# Patient Record
Sex: MALE | Race: BLACK OR AFRICAN AMERICAN | NOT HISPANIC OR LATINO | Employment: FULL TIME | URBAN - METROPOLITAN AREA
[De-identification: names, ages, dates, MRNs, and addresses within clinical notes are randomized per-mention and may not be internally consistent; named-entity substitution may affect disease eponyms.]

---

## 2018-04-24 ENCOUNTER — OFFICE VISIT (OUTPATIENT)
Dept: MEDICAL GROUP | Facility: MEDICAL CENTER | Age: 43
End: 2018-04-24
Payer: COMMERCIAL

## 2018-04-24 VITALS
DIASTOLIC BLOOD PRESSURE: 74 MMHG | WEIGHT: 159 LBS | SYSTOLIC BLOOD PRESSURE: 122 MMHG | RESPIRATION RATE: 14 BRPM | TEMPERATURE: 98.6 F | BODY MASS INDEX: 22.26 KG/M2 | HEART RATE: 74 BPM | HEIGHT: 71 IN | OXYGEN SATURATION: 97 %

## 2018-04-24 DIAGNOSIS — J01.00 ACUTE NON-RECURRENT MAXILLARY SINUSITIS: ICD-10-CM

## 2018-04-24 DIAGNOSIS — Z72.0 TOBACCO USE: ICD-10-CM

## 2018-04-24 PROCEDURE — 99214 OFFICE O/P EST MOD 30 MIN: CPT | Performed by: FAMILY MEDICINE

## 2018-04-24 RX ORDER — FLUTICASONE PROPIONATE 50 MCG
1 SPRAY, SUSPENSION (ML) NASAL DAILY
Qty: 16 G | Refills: 0 | Status: SHIPPED | OUTPATIENT
Start: 2018-04-24 | End: 2018-08-20

## 2018-04-24 RX ORDER — AMOXICILLIN AND CLAVULANATE POTASSIUM 875; 125 MG/1; MG/1
1 TABLET, FILM COATED ORAL 2 TIMES DAILY
Qty: 14 TAB | Refills: 0 | Status: SHIPPED | OUTPATIENT
Start: 2018-04-24 | End: 2018-05-01

## 2018-04-24 RX ORDER — NICOTINE 21 MG/24HR
1 PATCH, TRANSDERMAL 24 HOURS TRANSDERMAL EVERY 24 HOURS
Qty: 30 PATCH | Refills: 3 | Status: SHIPPED | OUTPATIENT
Start: 2018-04-24 | End: 2018-08-20

## 2018-04-24 ASSESSMENT — PATIENT HEALTH QUESTIONNAIRE - PHQ9: CLINICAL INTERPRETATION OF PHQ2 SCORE: 0

## 2018-04-25 NOTE — PROGRESS NOTES
"cc: Nasal congestion    Subjective:     Eyad Milton is a 42 y.o. male presenting with his girlfriend to discuss nasal congestion. Associated with the cough at night, subjective fevers, poor taste in his mouth. This has been going on for about 2 months, but is minimally better. Denies any fevers, nausea, vomiting, ear pain, sore throat, chest pain, shortness of breath, abdominal pain, rashes, recent travel. His daughters are sick with something similar. Denies any seasonal allergies. Has tried Benadryl with minimal improvement. He does continue to smoke about a pack per day. Would like to quit    Review of systems:  See above.        Current Outpatient Prescriptions:   •  nicotine (NICODERM) 14 MG/24HR PATCH 24 HR, Apply 1 Patch to skin as directed every 24 hours., Disp: 30 Patch, Rfl: 3  •  fluticasone (FLONASE) 50 MCG/ACT nasal spray, Spray 1 Spray in nose every day., Disp: 16 g, Rfl: 0  •  amoxicillin-clavulanate (AUGMENTIN) 875-125 MG Tab, Take 1 Tab by mouth 2 times a day for 7 days., Disp: 14 Tab, Rfl: 0    No Known Allergies    History reviewed. No pertinent past medical history.  History reviewed. No pertinent surgical history.  Family History   Problem Relation Age of Onset   • Other Mother      healthy   • Other Father      healthy     Social History     Social History   • Marital status: Single     Spouse name: N/A   • Number of children: N/A   • Years of education: N/A     Occupational History   • Not on file.     Social History Main Topics   • Smoking status: Current Every Day Smoker     Packs/day: 0.50     Types: Cigarettes   • Smokeless tobacco: Never Used   • Alcohol use 6.0 oz/week     10 Cans of beer per week   • Drug use: No   • Sexual activity: Yes     Partners: Female     Other Topics Concern   • Not on file     Social History Narrative   • No narrative on file       Objective:     Vitals: /74   Pulse 74   Temp 37 °C (98.6 °F)   Resp 14   Ht 1.803 m (5' 10.98\")   Wt 72.1 kg " (159 lb)   SpO2 97%   BMI 22.19 kg/m²   General: Alert, pleasant, NAD  HEENT: Normocephalic.  PERRL, EOMI, no icterus or pallor.  Conjunctivae and lids normal. External ears normal. Tympanic membranes pearly, opaque.  Nares patent, mucosa pink, drainage present.  Maxillary sinus tenderness.  Oropharynx non-erythematous, mucous membranes moist.  Neck supple.  No thyromegaly or masses palpated. No cervical or supraclavicular lymphadenopathy.  Heart: Regular rate and rhythm.  S1 and S2 normal.  No murmurs appreciated.  Respiratory: Normal respiratory effort.  Clear to auscultation bilaterally.  Abdomen: Non-distended, soft  Skin: Warm, dry, no rashes.  Musculoskeletal: Gait is normal.    Psych:  Affect/mood is normal, judgement is good, memory is intact, grooming is appropriate.    Assessment/Plan:     Eyad was seen today for uri.    Diagnoses and all orders for this visit:    Acute non-recurrent maxillary sinusitis  -     fluticasone (FLONASE) 50 MCG/ACT nasal spray; Spray 1 Spray in nose every day.  -     amoxicillin-clavulanate (AUGMENTIN) 875-125 MG Tab; Take 1 Tab by mouth 2 times a day for 7 days.    Tobacco use  Advice to quit, patient is interested in, he was interested in trying the nicotine patches. Follow-up in 3 months. He will need a pneumonia vaccine  -     nicotine (NICODERM) 14 MG/24HR PATCH 24 HR; Apply 1 Patch to skin as directed every 24 hours.        Return in about 3 months (around 7/24/2018) for routine follow up.

## 2018-05-07 ENCOUNTER — APPOINTMENT (OUTPATIENT)
Dept: MEDICAL GROUP | Facility: MEDICAL CENTER | Age: 43
End: 2018-05-07
Payer: COMMERCIAL

## 2018-07-23 ENCOUNTER — TELEPHONE (OUTPATIENT)
Dept: MEDICAL GROUP | Facility: MEDICAL CENTER | Age: 43
End: 2018-07-23

## 2018-08-20 ENCOUNTER — OFFICE VISIT (OUTPATIENT)
Dept: MEDICAL GROUP | Facility: MEDICAL CENTER | Age: 43
End: 2018-08-20
Payer: COMMERCIAL

## 2018-08-20 VITALS
HEIGHT: 70 IN | HEART RATE: 85 BPM | DIASTOLIC BLOOD PRESSURE: 72 MMHG | OXYGEN SATURATION: 98 % | RESPIRATION RATE: 16 BRPM | BODY MASS INDEX: 22.05 KG/M2 | WEIGHT: 154 LBS | SYSTOLIC BLOOD PRESSURE: 136 MMHG | TEMPERATURE: 97.4 F

## 2018-08-20 DIAGNOSIS — Z00.00 ROUTINE GENERAL MEDICAL EXAMINATION AT A HEALTH CARE FACILITY: ICD-10-CM

## 2018-08-20 DIAGNOSIS — Z23 NEED FOR PNEUMOCOCCAL VACCINE: ICD-10-CM

## 2018-08-20 PROCEDURE — 90471 IMMUNIZATION ADMIN: CPT | Performed by: NURSE PRACTITIONER

## 2018-08-20 PROCEDURE — 99396 PREV VISIT EST AGE 40-64: CPT | Mod: 25 | Performed by: NURSE PRACTITIONER

## 2018-08-20 PROCEDURE — 90732 PPSV23 VACC 2 YRS+ SUBQ/IM: CPT | Performed by: NURSE PRACTITIONER

## 2018-08-21 NOTE — PROGRESS NOTES
"Subjective:      Eyad Milton is a 42 y.o. male who presents with Annual Exam        CC: Patient is here today for annual physical and brings with him paper from his employer to be filled out for this.    HPI Eyad Milton was last seen by myself in May 2016 and he states he does not come in regularly because he is healthy. He was seen earlier this year for sinusitis by one of my colleagues. He does continue to smoke cigarettes although he states he only smokes a quarter pack a day now. He drinks about 7 cans of beer per week and denies drug usage. He feels he is generally healthy but has not done any lab work recently.      No current outpatient prescriptions on file.     No current facility-administered medications for this visit.      Social History   Substance Use Topics   • Smoking status: Current Every Day Smoker     Packs/day: 0.25     Types: Cigarettes   • Smokeless tobacco: Never Used   • Alcohol use 4.2 oz/week     7 Cans of beer per week     Family History   Problem Relation Age of Onset   • Other Mother         healthy   • Other Father         healthy   History reviewed. No pertinent past medical history.    Review of Systems   All other systems reviewed and are negative.         Objective:     /72   Pulse 85   Temp 36.3 °C (97.4 °F)   Resp 16   Ht 1.778 m (5' 10\")   Wt 69.9 kg (154 lb)   SpO2 98%   BMI 22.10 kg/m²      Physical Exam   Constitutional: He is oriented to person, place, and time. He appears well-developed and well-nourished. No distress.   HENT:   Head: Normocephalic and atraumatic.   Right Ear: External ear normal.   Left Ear: External ear normal.   Nose: Nose normal.   Mouth/Throat: Oropharynx is clear and moist.   Eyes: Conjunctivae are normal. Right eye exhibits no discharge. Left eye exhibits no discharge.   Neck: Normal range of motion. Neck supple. No tracheal deviation present. No thyromegaly present.   Cardiovascular: Normal rate, regular rhythm and " normal heart sounds.    No murmur heard.  Pulmonary/Chest: Effort normal and breath sounds normal. No respiratory distress. He has no wheezes. He has no rales.   Lymphadenopathy:     He has no cervical adenopathy.   Neurological: He is alert and oriented to person, place, and time. Coordination normal.   Skin: Skin is warm and dry. No rash noted. He is not diaphoretic. No erythema.   Psychiatric: He has a normal mood and affect. His behavior is normal. Judgment and thought content normal.   Nursing note and vitals reviewed.              Assessment/Plan:     1. Routine general medical examination at a health care facility  Physical exam today appears normal and I did recommend he get some baseline blood work done. He will follow up if abnormal. He was advised to quit smoking and moderate his alcohol.  - COMP METABOLIC PANEL; Future  - LIPID PROFILE; Future    2. Need for pneumococcal vaccine  I have placed the below orders and discussed them with an approved delegating provider. The MA is performing the below orders under the direction of Dr. Wilks    - PNEUMOCOCCAL POLYSACCHARIDE VACCINE 23-VALENT =>1YO SQ/IM

## 2019-05-02 ENCOUNTER — OFFICE VISIT (OUTPATIENT)
Dept: MEDICAL GROUP | Facility: MEDICAL CENTER | Age: 44
End: 2019-05-02
Payer: COMMERCIAL

## 2019-05-02 VITALS
BODY MASS INDEX: 24.05 KG/M2 | RESPIRATION RATE: 16 BRPM | DIASTOLIC BLOOD PRESSURE: 70 MMHG | SYSTOLIC BLOOD PRESSURE: 118 MMHG | HEART RATE: 91 BPM | HEIGHT: 70 IN | WEIGHT: 168 LBS | OXYGEN SATURATION: 97 %

## 2019-05-02 DIAGNOSIS — R19.09 NODULE OF GROIN: ICD-10-CM

## 2019-05-02 PROCEDURE — 99214 OFFICE O/P EST MOD 30 MIN: CPT | Performed by: NURSE PRACTITIONER

## 2019-05-02 RX ORDER — CEPHALEXIN 500 MG/1
500 CAPSULE ORAL 4 TIMES DAILY
Qty: 28 CAP | Refills: 0 | Status: SHIPPED | OUTPATIENT
Start: 2019-05-02 | End: 2019-05-09

## 2019-05-02 ASSESSMENT — PATIENT HEALTH QUESTIONNAIRE - PHQ9: CLINICAL INTERPRETATION OF PHQ2 SCORE: 0

## 2019-05-02 NOTE — PROGRESS NOTES
"Subjective:      Eyad Milton is a 43 y.o. male who presents with Other (lump in pelvic area)    CC: Patient here todaye4  Nodular area which she is concerned about.        HPI Eyad Milton      1. Nodule of groin  Patient reports that approximately 3 to 4 weeks ago he developed a irritated area in the mid groin region.  He states it was erythematous and swollen but it has gone down in size over the past week.  He had no associated fever, discharge or redness.  Soon afterwards he noticed a large nodular area to the right of this.  He states he feels otherwise well but it has not gotten better or worse.  Social History   Substance Use Topics   • Smoking status: Current Every Day Smoker     Packs/day: 0.25     Types: Cigarettes   • Smokeless tobacco: Never Used   • Alcohol use 4.2 oz/week     7 Cans of beer per week     Current Outpatient Prescriptions   Medication Sig Dispense Refill   • cephALEXin (KEFLEX) 500 MG Cap Take 1 Cap by mouth 4 times a day for 7 days. 28 Cap 0     No current facility-administered medications for this visit.    History reviewed. No pertinent past medical history.   Family History   Problem Relation Age of Onset   • Other Mother         healthy   • Other Father         healthy       Review of Systems   Skin: Positive for rash.   All other systems reviewed and are negative.         Objective:     /70 (BP Location: Right arm, Patient Position: Sitting, BP Cuff Size: Adult)   Pulse 91   Resp 16   Ht 1.778 m (5' 10\")   Wt 76.2 kg (168 lb)   SpO2 97%   BMI 24.11 kg/m²      Physical Exam   Constitutional: He is oriented to person, place, and time. He appears well-developed and well-nourished. No distress.   HENT:   Head: Normocephalic and atraumatic.   Right Ear: External ear normal.   Left Ear: External ear normal.   Nose: Nose normal.   Mouth/Throat: Oropharynx is clear and moist.   Eyes: Conjunctivae are normal. Right eye exhibits no discharge. Left eye exhibits " no discharge.   Neck: Normal range of motion. Neck supple. No tracheal deviation present. No thyromegaly present.   Cardiovascular: Normal rate, regular rhythm and normal heart sounds.    No murmur heard.  Pulmonary/Chest: Effort normal and breath sounds normal. No respiratory distress. He has no wheezes. He has no rales.   Lymphadenopathy:     He has no cervical adenopathy.   Neurological: He is alert and oriented to person, place, and time. Coordination normal.   Skin: Skin is warm and dry. Rash noted. He is not diaphoretic. No erythema.   There appears to be a healing abscess like area in the mid groin region and on the right side of the groin region there is a nonmovable nodular, nontender area.   Psychiatric: He has a normal mood and affect. His behavior is normal. Judgment and thought content normal.   Nursing note and vitals reviewed.              Assessment/Plan:     1. Nodule of groin  I suspect that patient had a recent groin abscess and now has subsequent reactive lymph node but I explained I cannot say for sure.  I will start him on Keflex for 7 days and the abscess area does not appear to need incision and drainage currently.  Assuming this does not improve in the next week, I recommended he have an ultrasound of the area to see if biopsy is needed.  - cephALEXin (KEFLEX) 500 MG Cap; Take 1 Cap by mouth 4 times a day for 7 days.  Dispense: 28 Cap; Refill: 0  - US-ABDOMEN LTD (SOFT TISSUE); Future

## 2019-08-09 ENCOUNTER — OFFICE VISIT (OUTPATIENT)
Dept: MEDICAL GROUP | Facility: MEDICAL CENTER | Age: 44
End: 2019-08-09
Payer: COMMERCIAL

## 2019-08-09 VITALS
WEIGHT: 165 LBS | DIASTOLIC BLOOD PRESSURE: 74 MMHG | SYSTOLIC BLOOD PRESSURE: 134 MMHG | OXYGEN SATURATION: 96 % | HEART RATE: 80 BPM | TEMPERATURE: 97.4 F | HEIGHT: 70 IN | RESPIRATION RATE: 16 BRPM | BODY MASS INDEX: 23.62 KG/M2

## 2019-08-09 DIAGNOSIS — H92.01 RIGHT EAR PAIN: ICD-10-CM

## 2019-08-09 DIAGNOSIS — J02.9 PHARYNGITIS, UNSPECIFIED ETIOLOGY: ICD-10-CM

## 2019-08-09 PROCEDURE — 99214 OFFICE O/P EST MOD 30 MIN: CPT | Performed by: NURSE PRACTITIONER

## 2019-08-09 RX ORDER — AMOXICILLIN AND CLAVULANATE POTASSIUM 875; 125 MG/1; MG/1
1 TABLET, FILM COATED ORAL 2 TIMES DAILY
Qty: 20 TAB | Refills: 0 | Status: SHIPPED | OUTPATIENT
Start: 2019-08-09 | End: 2019-08-19

## 2019-08-09 ASSESSMENT — ENCOUNTER SYMPTOMS: SORE THROAT: 1

## 2019-08-09 NOTE — PROGRESS NOTES
Subjective:      Eyad Milton is a 43 y.o. male who presents with Pharyngitis and Otalgia        CC: Patient is here today accompanied by his significant other for sore throat and ear pain.    HPI       1. Pharyngitis, unspecified etiology  Patient reports that for approximately 2 weeks he has been having sore throat.  It also affects his tongue and right ear.  He admits to a fractured tooth on the left upper side as well.  Nothing is made him feel better and symptoms are worse with swallowing.  He states he has no trouble with breathing or inability to swallow.  He denies fever, chills, nausea or vomiting.  He states he has not been exposed to anybody with strep throat that he is aware of.    2. Right ear pain  Patient states his right ear also has been giving him pain for about 1 to 2 weeks.  He has had no discharge from the ear  No past medical history on file.  Social History     Socioeconomic History   • Marital status: Single     Spouse name: Not on file   • Number of children: Not on file   • Years of education: Not on file   • Highest education level: Not on file   Occupational History   • Not on file   Social Needs   • Financial resource strain: Not on file   • Food insecurity:     Worry: Not on file     Inability: Not on file   • Transportation needs:     Medical: Not on file     Non-medical: Not on file   Tobacco Use   • Smoking status: Current Every Day Smoker     Packs/day: 0.25     Types: Cigarettes   • Smokeless tobacco: Never Used   Substance and Sexual Activity   • Alcohol use: Yes     Alcohol/week: 4.2 oz     Types: 7 Cans of beer per week   • Drug use: No   • Sexual activity: Yes     Partners: Female   Lifestyle   • Physical activity:     Days per week: Not on file     Minutes per session: Not on file   • Stress: Not on file   Relationships   • Social connections:     Talks on phone: Not on file     Gets together: Not on file     Attends Judaism service: Not on file     Active member of  "club or organization: Not on file     Attends meetings of clubs or organizations: Not on file     Relationship status: Not on file   • Intimate partner violence:     Fear of current or ex partner: Not on file     Emotionally abused: Not on file     Physically abused: Not on file     Forced sexual activity: Not on file   Other Topics Concern   • Not on file   Social History Narrative   • Not on file     Current Outpatient Medications   Medication Sig Dispense Refill   • amoxicillin-clavulanate (AUGMENTIN) 875-125 MG Tab Take 1 Tab by mouth 2 times a day for 10 days. 20 Tab 0     No current facility-administered medications for this visit.      Family History   Problem Relation Age of Onset   • Other Mother         healthy   • Other Father         healthy         Review of Systems   HENT: Positive for ear pain and sore throat.    All other systems reviewed and are negative.         Objective:     /74 (BP Location: Right arm, Patient Position: Sitting, BP Cuff Size: Adult)   Pulse 80   Temp 36.3 °C (97.4 °F) (Temporal)   Resp 16   Ht 1.778 m (5' 10\")   Wt 74.8 kg (165 lb)   SpO2 96%   BMI 23.68 kg/m²      Physical Exam   Constitutional: He is oriented to person, place, and time. He appears well-developed and well-nourished. No distress.   HENT:   Head: Normocephalic and atraumatic.   Right Ear: External ear normal.   Left Ear: External ear normal.   Nose: Nose normal.   Mouth/Throat: Oropharynx is clear and moist.   Right ear canal is occluded with cerumen.  Posterior pharynx is erythematous and mildly swollen with some white exudate.  There is a fractured left upper tooth.   Eyes: Conjunctivae are normal. Right eye exhibits no discharge. Left eye exhibits no discharge.   Neck: Normal range of motion. Neck supple. No tracheal deviation present. No thyromegaly present.   Cardiovascular: Normal rate, regular rhythm and normal heart sounds.   No murmur heard.  Pulmonary/Chest: Effort normal and breath sounds " normal. No respiratory distress. He has no wheezes. He has no rales.   Lymphadenopathy:        Head (right side): Tonsillar adenopathy present.        Head (left side): Tonsillar adenopathy present.     He has no cervical adenopathy.   Neurological: He is alert and oriented to person, place, and time. Coordination normal.   Skin: Skin is warm and dry. No rash noted. He is not diaphoretic. No erythema.   Psychiatric: He has a normal mood and affect. His behavior is normal. Judgment and thought content normal.   Nursing note and vitals reviewed.              Assessment/Plan:     1. Pharyngitis, unspecified etiology  Rapid strep in the office today is negative but patient shows erythema and exudate of the posterior pharynx.  I explained that this could be a strep throat that was not picked up on her testing but also could be radiating pain from the right ear or from his fractured tooth which all can be contributing to this.  I am going to put him on Augmentin to cover for possible otitis media or strep throat.  He was advised to go to the ER should he develop any trouble breathing or swallowing despite treatment.  He will go on probiotics.  I explained how to take the medicine appropriately.  He may use Tylenol for pain.  - amoxicillin-clavulanate (AUGMENTIN) 875-125 MG Tab; Take 1 Tab by mouth 2 times a day for 10 days.  Dispense: 20 Tab; Refill: 0    2. Right ear pain  I am unable to visualize the right tympanic membrane because of cerumen impaction and advised him to try to use over-the-counter Debrox earwax removal kit once he is starting to feel better.  - amoxicillin-clavulanate (AUGMENTIN) 875-125 MG Tab; Take 1 Tab by mouth 2 times a day for 10 days.  Dispense: 20 Tab; Refill: 0

## 2020-08-17 ENCOUNTER — OFFICE VISIT (OUTPATIENT)
Dept: MEDICAL GROUP | Facility: MEDICAL CENTER | Age: 45
End: 2020-08-17
Payer: COMMERCIAL

## 2020-08-17 VITALS
SYSTOLIC BLOOD PRESSURE: 124 MMHG | WEIGHT: 160 LBS | HEIGHT: 71 IN | BODY MASS INDEX: 22.4 KG/M2 | RESPIRATION RATE: 16 BRPM | OXYGEN SATURATION: 96 % | HEART RATE: 82 BPM | DIASTOLIC BLOOD PRESSURE: 76 MMHG

## 2020-08-17 DIAGNOSIS — Z00.00 ROUTINE GENERAL MEDICAL EXAMINATION AT A HEALTH CARE FACILITY: ICD-10-CM

## 2020-08-17 DIAGNOSIS — Z23 NEED FOR TDAP VACCINATION: ICD-10-CM

## 2020-08-17 PROCEDURE — 90715 TDAP VACCINE 7 YRS/> IM: CPT | Performed by: NURSE PRACTITIONER

## 2020-08-17 PROCEDURE — 90471 IMMUNIZATION ADMIN: CPT | Performed by: NURSE PRACTITIONER

## 2020-08-17 PROCEDURE — 99396 PREV VISIT EST AGE 40-64: CPT | Mod: 25 | Performed by: NURSE PRACTITIONER

## 2020-08-17 ASSESSMENT — PATIENT HEALTH QUESTIONNAIRE - PHQ9: CLINICAL INTERPRETATION OF PHQ2 SCORE: 0

## 2020-08-18 NOTE — PROGRESS NOTES
Subjective:      Eyad Milton is a 44 y.o. male who presents with Annual Exam        CC: Patient is here today accompanied by his wife for yearly physical.  His last physical was in 2018.    HPI         1. Routine general medical examination at a health care facility  Patient reports he feels well and has no physical or mental health complaints today.  He had some mild lower right groin pain about 2 weeks ago that lasted for an hour or so but went away and has not returned.  He states he does exercise and works full-time.    2. Need for Tdap vaccination  Patient states he has not had a vaccine in at least 10 years.  History reviewed. No pertinent past medical history.  Social History     Socioeconomic History   • Marital status: Single     Spouse name: Not on file   • Number of children: Not on file   • Years of education: Not on file   • Highest education level: Not on file   Occupational History   • Not on file   Social Needs   • Financial resource strain: Not on file   • Food insecurity     Worry: Not on file     Inability: Not on file   • Transportation needs     Medical: Not on file     Non-medical: Not on file   Tobacco Use   • Smoking status: Current Every Day Smoker     Packs/day: 0.25     Types: Cigarettes   • Smokeless tobacco: Never Used   Substance and Sexual Activity   • Alcohol use: Yes     Alcohol/week: 4.2 oz     Types: 7 Cans of beer per week   • Drug use: No   • Sexual activity: Yes     Partners: Female   Lifestyle   • Physical activity     Days per week: Not on file     Minutes per session: Not on file   • Stress: Not on file   Relationships   • Social connections     Talks on phone: Not on file     Gets together: Not on file     Attends Orthodox service: Not on file     Active member of club or organization: Not on file     Attends meetings of clubs or organizations: Not on file     Relationship status: Not on file   • Intimate partner violence     Fear of current or ex partner: Not on  "file     Emotionally abused: Not on file     Physically abused: Not on file     Forced sexual activity: Not on file   Other Topics Concern   • Not on file   Social History Narrative   • Not on file     No current outpatient medications on file.     No current facility-administered medications for this visit.      Family History   Problem Relation Age of Onset   • Other Mother         healthy   • Other Father         healthy         Review of Systems   All other systems reviewed and are negative.         Objective:     /76 (BP Location: Left arm, Patient Position: Sitting, BP Cuff Size: Adult)   Pulse 82   Resp 16   Ht 1.803 m (5' 11\")   Wt 72.6 kg (160 lb)   SpO2 96%   BMI 22.32 kg/m²      Physical Exam  Vitals signs and nursing note reviewed.   Constitutional:       General: He is not in acute distress.     Appearance: He is well-developed. He is not diaphoretic.   HENT:      Head: Normocephalic and atraumatic.      Right Ear: External ear normal.      Left Ear: External ear normal.      Nose: Nose normal.   Eyes:      General:         Right eye: No discharge.         Left eye: No discharge.      Conjunctiva/sclera: Conjunctivae normal.   Neck:      Musculoskeletal: Normal range of motion and neck supple.      Thyroid: No thyromegaly.      Trachea: No tracheal deviation.   Cardiovascular:      Rate and Rhythm: Normal rate and regular rhythm.      Heart sounds: Normal heart sounds. No murmur.   Pulmonary:      Effort: Pulmonary effort is normal. No respiratory distress.      Breath sounds: Normal breath sounds. No wheezing or rales.   Abdominal:      General: Bowel sounds are normal. There is no distension.      Palpations: Abdomen is soft. There is no mass.      Tenderness: There is no abdominal tenderness. There is no guarding or rebound.      Hernia: No hernia is present.   Lymphadenopathy:      Cervical: No cervical adenopathy.   Skin:     General: Skin is warm and dry.      Findings: No erythema or " rash.   Neurological:      Mental Status: He is alert and oriented to person, place, and time.      Coordination: Coordination normal.   Psychiatric:         Behavior: Behavior normal.         Thought Content: Thought content normal.         Judgment: Judgment normal.                 Assessment/Plan:        1. Routine general medical examination at a health care facility  Exam appears normal today and I again recommended lab work since he has not done it over the last 4 years.  I told him if he develops abdominal pain again and it is persisting, then he should come back to the office for recheck.  We will continue to exercise and keep his BMI under control.  - Comp Metabolic Panel; Future  - Lipid Profile; Future    2. Need for Tdap vaccination  I have placed the below orders and discussed them with an approved delegating provider. The MA is performing the below orders under the direction of Dr. Lackey    - Tdap =>8yo IM

## 2021-02-19 ENCOUNTER — OFFICE VISIT (OUTPATIENT)
Dept: URGENT CARE | Facility: PHYSICIAN GROUP | Age: 46
End: 2021-02-19
Payer: COMMERCIAL

## 2021-02-19 VITALS
HEART RATE: 78 BPM | RESPIRATION RATE: 16 BRPM | TEMPERATURE: 97.2 F | BODY MASS INDEX: 23.1 KG/M2 | SYSTOLIC BLOOD PRESSURE: 134 MMHG | DIASTOLIC BLOOD PRESSURE: 100 MMHG | WEIGHT: 165 LBS | HEIGHT: 71 IN | OXYGEN SATURATION: 96 %

## 2021-02-19 DIAGNOSIS — Z20.2 EXPOSURE TO TRICHOMONAS: ICD-10-CM

## 2021-02-19 PROCEDURE — 99213 OFFICE O/P EST LOW 20 MIN: CPT | Performed by: PHYSICIAN ASSISTANT

## 2021-02-19 RX ORDER — METRONIDAZOLE 500 MG/1
500 TABLET ORAL 2 TIMES DAILY
Qty: 14 TABLET | Refills: 0 | Status: SHIPPED | OUTPATIENT
Start: 2021-02-19 | End: 2021-02-26

## 2021-02-20 ASSESSMENT — ENCOUNTER SYMPTOMS
CHILLS: 0
SHORTNESS OF BREATH: 0
ABDOMINAL PAIN: 0
VOMITING: 0
COUGH: 0
SORE THROAT: 0
DIARRHEA: 0
FEVER: 0
EYE PAIN: 0
MYALGIAS: 0
HEADACHES: 0
NAUSEA: 0
CONSTIPATION: 0

## 2021-02-21 NOTE — PROGRESS NOTES
"Subjective:   Eyad Milton is a 45 y.o. male who presents for Sexually Transmitted Diseases (Testing, partner pos for trich)      HPI:  This is a 45-year-old male who presents to urgent care after his partner tested positive for trichomonas yesterday and was prescribed course of metronidazole 500 mg twice daily for 7 days.  The patient denies any symptoms including penile discharge, dysuria, frequency or urgency.  He has exposure to trichomonas and is requesting treatment.  Both him and his partner befuddled as they have no risk factors for developing a trichomoniasis infection    Review of Systems   Constitutional: Negative for chills and fever.   HENT: Negative for congestion, ear pain and sore throat.    Eyes: Negative for pain.   Respiratory: Negative for cough and shortness of breath.    Cardiovascular: Negative for chest pain.   Gastrointestinal: Negative for abdominal pain, constipation, diarrhea, nausea and vomiting.   Genitourinary: Negative for dysuria.   Musculoskeletal: Negative for myalgias.   Skin: Negative for rash.   Neurological: Negative for headaches.       Medications:    • metroNIDAZOLE Tabs    Allergies: Patient has no known allergies.    Problem List: Eyad Milton has Tobacco use on their problem list.    Surgical History:  No past surgical history on file.    Past Social Hx: Eyad Milton  reports that he has been smoking cigarettes. He has been smoking about 0.25 packs per day. He has never used smokeless tobacco. He reports current alcohol use of about 4.2 oz of alcohol per week. He reports that he does not use drugs.     Past Family Hx:  Eyad Milton family history includes Other in his father and mother.     Problem list, medications, and allergies reviewed by myself today in Epic.     Objective:     /100   Pulse 78   Temp 36.2 °C (97.2 °F)   Resp 16   Ht 1.803 m (5' 11\")   Wt 74.8 kg (165 lb)   SpO2 96%   BMI 23.01 kg/m²     Physical " Exam  Vitals reviewed.   Constitutional:       Appearance: Normal appearance.   HENT:      Head: Normocephalic and atraumatic.      Right Ear: External ear normal.      Left Ear: External ear normal.      Nose: Nose normal.      Mouth/Throat:      Mouth: Mucous membranes are moist.   Eyes:      Conjunctiva/sclera: Conjunctivae normal.   Cardiovascular:      Rate and Rhythm: Normal rate.   Pulmonary:      Effort: Pulmonary effort is normal.   Skin:     General: Skin is warm and dry.      Capillary Refill: Capillary refill takes less than 2 seconds.   Neurological:      Mental Status: He is alert and oriented to person, place, and time.         Assessment/Plan:     Diagnosis and associated orders:     1. Exposure to trichomonas  metroNIDAZOLE (FLAGYL) 500 MG Tab      Comments/MDM:              Differential diagnosis, natural history, supportive care, and indications for immediate follow-up discussed.    Advised the patient to follow-up with the primary care physician for recheck, reevaluation, and consideration of further management.    Please note that this dictation was created using voice recognition software. I have made a reasonable attempt to correct obvious errors, but I expect that there are errors of grammar and possibly content that I did not discover before finalizing the note.    This note was electronically signed by Wang Yun PA-C

## 2021-10-20 ENCOUNTER — OFFICE VISIT (OUTPATIENT)
Dept: MEDICAL GROUP | Facility: PHYSICIAN GROUP | Age: 46
End: 2021-10-20
Payer: COMMERCIAL

## 2021-10-20 VITALS
DIASTOLIC BLOOD PRESSURE: 72 MMHG | HEIGHT: 70 IN | OXYGEN SATURATION: 97 % | BODY MASS INDEX: 23.05 KG/M2 | WEIGHT: 161 LBS | TEMPERATURE: 98.5 F | HEART RATE: 87 BPM | RESPIRATION RATE: 14 BRPM | SYSTOLIC BLOOD PRESSURE: 102 MMHG

## 2021-10-20 DIAGNOSIS — Z72.0 TOBACCO USE: ICD-10-CM

## 2021-10-20 DIAGNOSIS — R19.5 CHANGE IN CONSISTENCY OF STOOL: ICD-10-CM

## 2021-10-20 DIAGNOSIS — K40.21 BILATERAL RECURRENT INGUINAL HERNIA WITHOUT OBSTRUCTION OR GANGRENE: ICD-10-CM

## 2021-10-20 DIAGNOSIS — Z13.220 SCREENING, LIPID: ICD-10-CM

## 2021-10-20 DIAGNOSIS — R10.31 RLQ ABDOMINAL PAIN: ICD-10-CM

## 2021-10-20 DIAGNOSIS — Z13.228 SCREENING FOR METABOLIC DISORDER: ICD-10-CM

## 2021-10-20 DIAGNOSIS — Z23 NEED FOR VACCINATION: ICD-10-CM

## 2021-10-20 PROCEDURE — 99214 OFFICE O/P EST MOD 30 MIN: CPT | Mod: 25 | Performed by: STUDENT IN AN ORGANIZED HEALTH CARE EDUCATION/TRAINING PROGRAM

## 2021-10-20 PROCEDURE — 90471 IMMUNIZATION ADMIN: CPT | Performed by: STUDENT IN AN ORGANIZED HEALTH CARE EDUCATION/TRAINING PROGRAM

## 2021-10-20 PROCEDURE — 90686 IIV4 VACC NO PRSV 0.5 ML IM: CPT | Performed by: STUDENT IN AN ORGANIZED HEALTH CARE EDUCATION/TRAINING PROGRAM

## 2021-10-20 ASSESSMENT — PATIENT HEALTH QUESTIONNAIRE - PHQ9: CLINICAL INTERPRETATION OF PHQ2 SCORE: 0

## 2021-10-20 NOTE — ASSESSMENT & PLAN NOTE
Chronic and ongoing issue, for at least 15 years.   Had previously noted reducible left inguinal hernia,      but recently concerned about hernia / pain on right groin.  Thinks he had imaging, a long time ago,     but not in our system.  Previously declined surgery, due to cost.   Now has insurance and would like follow-up.   - will get CT, given other pain concern....  ... likely referral, depending on results...

## 2021-10-20 NOTE — PATIENT INSTRUCTIONS
Please get the labs done in the next few days.  Please get them done while fasting (no food, coffee, or juices, for 8 hours - but water is ok).     Please get the imaging done, when able.  Please return in 2 weeks.  Thank you.

## 2021-10-20 NOTE — ASSESSMENT & PLAN NOTE
Chronic and ongoing.   Currently smoking 1 ppd.   Has tried using nicotine gum, but didn't work.   Open to trying to quit.   - will get basic labs  (none recently).   ...  Then may consider Wellbutrin       (chantix on national backorder).

## 2021-10-20 NOTE — PROGRESS NOTES
New to Provider  (Prior [facility])      Eyad Milton is a 46 y.o. male.    Reason for visit: PCP switch         - Prior PCP:  Karol VASQUES ([facility]).     Chief Complaint   Patient presents with   • Establish Care             Problems discussed this visit:    This note uses problem-based documentation.  Subjective HPI is included in Assessment & Plan, at bottom of note.   Problem   Bilateral Recurrent Inguinal Hernia Without Obstruction Or Gangrene   Rlq Abdominal Pain   Change in Consistency of Stool   Tobacco Use                          Past Medical History:   Diagnosis Date   • Hernia, inguinal        History reviewed. No pertinent surgical history.    Family History   Problem Relation Age of Onset   • Cancer Mother         uncertain type; but treated.   • No Known Problems Father    • No Known Problems Daughter        Social History     Tobacco Use   • Smoking status: Current Every Day Smoker     Packs/day: 0.25     Years: 30.00     Pack years: 7.50     Types: Cigarettes   • Smokeless tobacco: Never Used   • Tobacco comment: Smokes  (prev.1/4ppd x 30 yrs... recently up to 1 ppd)....   Substance Use Topics   • Alcohol use: Yes     Comment: 3 per week       Current medications:  (including new changes):  No current outpatient medications on file.     No current facility-administered medications for this visit.       Allergies as of 10/20/2021   • (No Known Allergies)                    Review of Symptoms  (as noted elsewhere, and .....)    GEN/CONST:   Denies fever, chills, fatigue,    CARDIO:   Denies chest pain, palpitations, or edema.    RESP:   Denies shortness of breath, wheezing, or coughing.  GI:    Denies nausea, vomiting,     ...  But soft consistency... (borderline diarrhea ?)      + abdominal/pelvic pains / hernias, per HPI....  :   Denies dysuria, hematuria,     MSK:  Denies joint pains  or muscle aches.    NEURO:  Denies numbness or focal weakness.       PSYCH:  Denies  "anxiety, depression,       Physical Exam  /72 (BP Location: Right arm, Patient Position: Sitting, BP Cuff Size: Adult)   Pulse 87   Temp 36.9 °C (98.5 °F) (Temporal)   Resp 14   Ht 1.765 m (5' 9.5\")   Wt 73 kg (161 lb)   SpO2 97%   BMI 23.43 kg/m²   General:  Alert and oriented, No apparent distress.  Neck: Supple. No lymphadenopathy noted. Thyroid not enlarged.   Lungs: Clear to auscultation bilaterally.  No wheezes or rales.     Cardiovascular: Regular rate and rhythm.  No murmurs, or gallops.  Abdomen:  Soft.  Non-distended.  Non-tender.        + bulge with cough, over left inguinal region.      - No bulge on right inguinal region.      ? Slightly/questionable tender to right suprapubic region        (but far medial to McBurney's point).   Extremities: No leg edema.  Good general motion of extremities.    Psychological: Appears to have normal mood and affect.      Labs:  No recent labs to review....                          Assessment & Plan  (with subjective history and orders):  Of note, the list below is not in a specific nor sortable order.      Problem List Items Addressed This Visit     Bilateral recurrent inguinal hernia without obstruction or gangrene     Chronic and ongoing issue, for at least 15 years.   Had previously noted reducible left inguinal hernia,      but recently concerned about hernia / pain on right groin.  Thinks he had imaging, a long time ago,     but not in our system.  Previously declined surgery, due to cost.   Now has insurance and would like follow-up.   - will get CT, given other pain concern....  ... likely referral, depending on results...         Relevant Orders    CT-ABDOMEN-PELVIS WITH    Change in consistency of stool     Patient only notes mild change, in stool.  Chronically soft for about 6 months.  Denies watery diarrhea or blood.  No nausea or vomiting, fevers or chills.  -Will try Metamucil /fiber at night.  -Also getting CT, as noted elsewhere.  -follow-up " again, after basic labs and imaging.          Relevant Orders    CBC WITH DIFFERENTIAL    TSH WITH REFLEX TO FT4    RLQ abdominal pain     Fairly recent issue for past few weeks/month.  Has noted more pain discomfort,     Mostly RLQ / on right inguinal area.  Had attributed to possible hernia,     (due ot hx on other side)     but never felt a bulge on that side.  Notes some softer stools than usual,      for the past few months.  Notes episodic pain increase with activities/walking.  Exam with discomfort medial to McBurney     (but negative McBurney, and no rebound).   - get Ab/plv CT (with contrast) to check abdominal       structures as well as for potential hernias.          Relevant Orders    CT-ABDOMEN-PELVIS WITH    CBC WITH DIFFERENTIAL    Comp Metabolic Panel    Lipid Profile    TSH WITH REFLEX TO FT4    Tobacco use     Chronic and ongoing.   Currently smoking 1 ppd.   Has tried using nicotine gum, but didn't work.   Open to trying to quit.   - will get basic labs  (none recently).   ...  Then may consider Wellbutrin       (chantix on national backorder).          Relevant Orders    CBC WITH DIFFERENTIAL      Other Visit Diagnoses     Need for vaccination        Relevant Orders    INFLUENZA VACCINE QUAD INJ (PF) (Completed)    Screening for metabolic disorder        Relevant Orders    CBC WITH DIFFERENTIAL    Comp Metabolic Panel    TSH WITH REFLEX TO FT4    Screening, lipid        Relevant Orders    Lipid Profile      Of note, the above list is not in a specific nor sortable order.                  Diagnosis Table, with orders:  1. RLQ abdominal pain  CT-ABDOMEN-PELVIS WITH    CBC WITH DIFFERENTIAL    Comp Metabolic Panel    Lipid Profile    TSH WITH REFLEX TO FT4   2. Bilateral recurrent inguinal hernia without obstruction or gangrene  CT-ABDOMEN-PELVIS WITH   3. Change in consistency of stool  CBC WITH DIFFERENTIAL    TSH WITH REFLEX TO FT4   4. Tobacco use  CBC WITH DIFFERENTIAL   5. Screening for  metabolic disorder  CBC WITH DIFFERENTIAL    Comp Metabolic Panel    TSH WITH REFLEX TO FT4   6. Screening, lipid  Lipid Profile   7. Need for vaccination  INFLUENZA VACCINE QUAD INJ (PF)                 Follow-up:    2 weeks ... Labs, Screening  (imaging, if done)              A computerized dictation system may have been used in this note.    Despite review, there may be some errors in spelling or grammar.    Gil Burleson M.D.  10/20/2021

## 2021-10-20 NOTE — ASSESSMENT & PLAN NOTE
Patient only notes mild change, in stool.  Chronically soft for about 6 months.  Denies watery diarrhea or blood.  No nausea or vomiting, fevers or chills.  -Will try Metamucil /fiber at night.  -Also getting CT, as noted elsewhere.  -follow-up again, after basic labs and imaging.

## 2021-10-20 NOTE — ASSESSMENT & PLAN NOTE
Fairly recent issue for past few weeks/month.  Has noted more pain discomfort,     Mostly RLQ / on right inguinal area.  Had attributed to possible hernia,     (due ot hx on other side)     but never felt a bulge on that side.  Notes some softer stools than usual,      for the past few months.  Notes episodic pain increase with activities/walking.  Exam with discomfort medial to McBurney     (but negative McBurney, and no rebound).   - get Ab/plv CT (with contrast) to check abdominal       structures as well as for potential hernias.

## 2021-11-01 ENCOUNTER — TELEPHONE (OUTPATIENT)
Dept: MEDICAL GROUP | Facility: PHYSICIAN GROUP | Age: 46
End: 2021-11-01

## 2021-11-01 DIAGNOSIS — K40.21 BILATERAL RECURRENT INGUINAL HERNIA WITHOUT OBSTRUCTION OR GANGRENE: ICD-10-CM

## 2021-11-01 DIAGNOSIS — R10.31 RLQ ABDOMINAL PAIN: ICD-10-CM

## 2021-11-01 DIAGNOSIS — R19.5 CHANGE IN CONSISTENCY OF STOOL: ICD-10-CM

## 2021-11-01 NOTE — PROGRESS NOTES
Insurance refused the order for CT abdomen.  Placing order for ultrasound (complete abdomen, as well as hernia).  1. RLQ abdominal pain  US-ABDOMEN COMPLETE SURVEY   2. Change in consistency of stool  US-ABDOMEN COMPLETE SURVEY   3. Bilateral recurrent inguinal hernia without obstruction or gangrene  US-INGUINAL HERNIA   Gil Burleson M.D., 11/1/2021

## 2021-11-01 NOTE — TELEPHONE ENCOUNTER
Please call patient.     Was contacted by imaging, as insurance declined to pay for his CT scan.…  They wanted ultrasounds first.  ...   Therefore, I placed order for complete abdomen ultrasound, as well as for hernia ultrasound.    Please call patient, and let him know he should double check and call imaging ([phone]) to schedule these procedures.  Thank you.

## 2021-11-08 ENCOUNTER — APPOINTMENT (OUTPATIENT)
Dept: RADIOLOGY | Facility: MEDICAL CENTER | Age: 46
End: 2021-11-08
Attending: STUDENT IN AN ORGANIZED HEALTH CARE EDUCATION/TRAINING PROGRAM
Payer: COMMERCIAL

## 2021-11-12 ENCOUNTER — OFFICE VISIT (OUTPATIENT)
Dept: MEDICAL GROUP | Facility: PHYSICIAN GROUP | Age: 46
End: 2021-11-12
Payer: COMMERCIAL

## 2021-11-12 VITALS
BODY MASS INDEX: 23.77 KG/M2 | DIASTOLIC BLOOD PRESSURE: 60 MMHG | WEIGHT: 166 LBS | HEART RATE: 90 BPM | HEIGHT: 70 IN | TEMPERATURE: 98.8 F | OXYGEN SATURATION: 99 % | SYSTOLIC BLOOD PRESSURE: 110 MMHG

## 2021-11-12 DIAGNOSIS — Z00.00 HEALTHCARE MAINTENANCE: ICD-10-CM

## 2021-11-12 DIAGNOSIS — K40.21 BILATERAL RECURRENT INGUINAL HERNIA WITHOUT OBSTRUCTION OR GANGRENE: ICD-10-CM

## 2021-11-12 DIAGNOSIS — Z00.00 PREVENTATIVE HEALTH CARE: ICD-10-CM

## 2021-11-12 PROCEDURE — 99213 OFFICE O/P EST LOW 20 MIN: CPT | Performed by: STUDENT IN AN ORGANIZED HEALTH CARE EDUCATION/TRAINING PROGRAM

## 2021-11-12 NOTE — PROGRESS NOTES
Established Patient     Eyad Milton is a 46 y.o. male.    Chief Complaint   Patient presents with   • Hernia     having more pain    • Paperwork     form for work / preventative             Problems addressed this visit:     This note uses problem-based documentation.  Subjective HPI is included in Assessment & Plan, at bottom of note.   Problem   Preventative Health Care   Healthcare Maintenance   Bilateral Recurrent Inguinal Hernia Without Obstruction Or Gangrene                           Past Medical History:   Diagnosis Date   • Hernia, inguinal        Patient Active Problem List   Diagnosis   • Tobacco use   • Bilateral recurrent inguinal hernia without obstruction or gangrene   • RLQ abdominal pain   • Change in consistency of stool   • Preventative health care   • Healthcare maintenance       History reviewed. No pertinent surgical history.       Family History   Problem Relation Age of Onset   • Cancer Mother         uncertain type; but treated.   • No Known Problems Father    • No Known Problems Daughter        Social History     Tobacco Use   • Smoking status: Current Every Day Smoker     Packs/day: 0.25     Years: 30.00     Pack years: 7.50     Types: Cigarettes   • Smokeless tobacco: Never Used   • Tobacco comment: Smokes  (prev.1/4ppd x 30 yrs... recently up to 1 ppd)....   Substance Use Topics   • Alcohol use: Yes     Comment: 3 per week       Current medications:  (including new changes):  No current outpatient medications on file.     No current facility-administered medications for this visit.       Allergies as of 11/12/2021   • (No Known Allergies)                   Review of Symptoms   (as noted elsewhere, and .....)   GEN/CONST:   Denies fever, chills,    CARDIO:   Denies chest pain, or edema.    RESP:   Denies shortness of breath, or coughing.  GI:    Denies nausea, vomiting,          + bilat.ing.hernias / pain/tenderness....  PSYCH:  Denies anxiety, depression,           Physical  "Exam  /60 (BP Location: Left arm, Patient Position: Sitting, BP Cuff Size: Adult)   Pulse 90   Temp 37.1 °C (98.8 °F) (Temporal)   Ht 1.765 m (5' 9.5\")   Wt 75.3 kg (166 lb)   SpO2 99%   BMI 24.16 kg/m²   General:  Alert and oriented, No apparent distress.    Lungs: Clear to auscultation bilaterally.  No wheezes or rales.  Cardiovascular: Regular rate and rhythm.  No murmurs, rubs or gallops.  Abdomen:  Soft.   No rebound or guarding.      + both inguinal regions with bulge on coughing. (L>R).     + tenderness over right inguinal canal.   Extremities:  No leg edema.  Good general motion of extremities.   Psychological: Appears to have normal mood and affect.          Labs: ... still has not obtained his labwork....    ... has multiple prior /  lab orders for past several years,       that were never done.                             Assessment & Plan  (with subjective history and orders):  Of note, the list below is not in a specific nor sortable order.      Problem List Items Addressed This Visit     Bilateral recurrent inguinal hernia without obstruction or gangrene     Chronic and ongoing issue, for at least 15 years.   Had previously noted reducible left inguinal hernia,      but recently concerned about hernia / pain on right groin.  Has been ongoing acutely, for ~1-2 months.     However, recently progressing and degree of pain.  Previously declined surgery, due to cost.   Now has insurance and would like follow-up.   … Previously ordered CT scan, but declined by insurance.  - Ultrasound has been ordered, but still pending scheduling.  - On exam, today, more prominence and tenderness than before.     (Bulge with cough, bilaterally, but more on left).  - Will place referral to general surgery.         Relevant Orders    Referral to General Surgery    Healthcare maintenance     influ vaccine - 10/2021  Pneumo Vaccine - n/a  Tetanus - 2020   Shingles, Colonoscopy, - n/a  Dexa, Hep.C.screen, " PSA - n/a   Labs < 12 mo / met screen - not done yet....          Preventative health care     Patient presents today, with forms to be completed for his employer, noting that he needs to have annual/preventative physical.… We will go over health screening, and preventive measures, as noted elsewhere.…   ... However, has not had blood work done, so can not speak to general screenings or lab work.   - form completion declined, until after labs completed.              Of note, the above list is not in a specific nor sortable order.                  Diagnosis Table, with orders:  1. Bilateral recurrent inguinal hernia without obstruction or gangrene  Referral to General Surgery   2. Preventative health care     3. Healthcare maintenance                     Follow-up:  2 weeks  (Labs and form)              A computerized dictation system may have been used in this note.    Despite review, there may be some errors in spelling or grammar.    Gil Burleson M.D.  11/12/2021

## 2021-11-12 NOTE — PATIENT INSTRUCTIONS
Please call imaging ([phone]), in order to schedule the ultrasound, to further evaluate the hernias.    Please follow-up with the referral(s) to GENERAL SURGERY.  You will likely receive a phone call or [employer] message, with information about what office to contact, in order to schedule.  However, if you do not hear from them in the next week or two, please call [phone], and ask for the referral line, to find out the status of the referral.       Please get the labs done in the next few days.  Please get them done while fasting (no food, coffee, or juices, for 8 hours - but water is ok).     Please return to review labs, and complete the form, at least 5-7 days after the lab work is done.    Thank you.

## 2021-11-17 ENCOUNTER — HOSPITAL ENCOUNTER (OUTPATIENT)
Dept: LAB | Facility: MEDICAL CENTER | Age: 46
End: 2021-11-17
Attending: STUDENT IN AN ORGANIZED HEALTH CARE EDUCATION/TRAINING PROGRAM
Payer: COMMERCIAL

## 2021-11-17 ENCOUNTER — HOSPITAL ENCOUNTER (OUTPATIENT)
Dept: RADIOLOGY | Facility: MEDICAL CENTER | Age: 46
End: 2021-11-17
Attending: STUDENT IN AN ORGANIZED HEALTH CARE EDUCATION/TRAINING PROGRAM
Payer: COMMERCIAL

## 2021-11-17 DIAGNOSIS — K40.21 BILATERAL RECURRENT INGUINAL HERNIA WITHOUT OBSTRUCTION OR GANGRENE: ICD-10-CM

## 2021-11-17 DIAGNOSIS — Z72.0 TOBACCO USE: ICD-10-CM

## 2021-11-17 DIAGNOSIS — R10.31 RLQ ABDOMINAL PAIN: ICD-10-CM

## 2021-11-17 DIAGNOSIS — Z13.220 SCREENING, LIPID: ICD-10-CM

## 2021-11-17 DIAGNOSIS — R19.5 CHANGE IN CONSISTENCY OF STOOL: ICD-10-CM

## 2021-11-17 DIAGNOSIS — Z13.228 SCREENING FOR METABOLIC DISORDER: ICD-10-CM

## 2021-11-17 LAB
ALBUMIN SERPL BCP-MCNC: 4.7 G/DL (ref 3.2–4.9)
ALBUMIN/GLOB SERPL: 1.5 G/DL
ALP SERPL-CCNC: 62 U/L (ref 30–99)
ALT SERPL-CCNC: 24 U/L (ref 2–50)
ANION GAP SERPL CALC-SCNC: 10 MMOL/L (ref 7–16)
AST SERPL-CCNC: 23 U/L (ref 12–45)
BASOPHILS # BLD AUTO: 0.5 % (ref 0–1.8)
BASOPHILS # BLD: 0.04 K/UL (ref 0–0.12)
BILIRUB SERPL-MCNC: 0.7 MG/DL (ref 0.1–1.5)
BUN SERPL-MCNC: 16 MG/DL (ref 8–22)
CALCIUM SERPL-MCNC: 9.1 MG/DL (ref 8.5–10.5)
CHLORIDE SERPL-SCNC: 108 MMOL/L (ref 96–112)
CHOLEST SERPL-MCNC: 207 MG/DL (ref 100–199)
CO2 SERPL-SCNC: 25 MMOL/L (ref 20–33)
CREAT SERPL-MCNC: 0.94 MG/DL (ref 0.5–1.4)
EOSINOPHIL # BLD AUTO: 0.61 K/UL (ref 0–0.51)
EOSINOPHIL NFR BLD: 7.9 % (ref 0–6.9)
ERYTHROCYTE [DISTWIDTH] IN BLOOD BY AUTOMATED COUNT: 45.9 FL (ref 35.9–50)
FASTING STATUS PATIENT QL REPORTED: NORMAL
GLOBULIN SER CALC-MCNC: 3.1 G/DL (ref 1.9–3.5)
GLUCOSE SERPL-MCNC: 90 MG/DL (ref 65–99)
HCT VFR BLD AUTO: 46.5 % (ref 42–52)
HDLC SERPL-MCNC: 49 MG/DL
HGB BLD-MCNC: 15.7 G/DL (ref 14–18)
IMM GRANULOCYTES # BLD AUTO: 0.01 K/UL (ref 0–0.11)
IMM GRANULOCYTES NFR BLD AUTO: 0.1 % (ref 0–0.9)
LDLC SERPL CALC-MCNC: 135 MG/DL
LYMPHOCYTES # BLD AUTO: 2.07 K/UL (ref 1–4.8)
LYMPHOCYTES NFR BLD: 26.7 % (ref 22–41)
MCH RBC QN AUTO: 31 PG (ref 27–33)
MCHC RBC AUTO-ENTMCNC: 33.8 G/DL (ref 33.7–35.3)
MCV RBC AUTO: 91.7 FL (ref 81.4–97.8)
MONOCYTES # BLD AUTO: 0.46 K/UL (ref 0–0.85)
MONOCYTES NFR BLD AUTO: 5.9 % (ref 0–13.4)
NEUTROPHILS # BLD AUTO: 4.55 K/UL (ref 1.82–7.42)
NEUTROPHILS NFR BLD: 58.9 % (ref 44–72)
NRBC # BLD AUTO: 0 K/UL
NRBC BLD-RTO: 0 /100 WBC
PLATELET # BLD AUTO: 271 K/UL (ref 164–446)
PMV BLD AUTO: 11 FL (ref 9–12.9)
POTASSIUM SERPL-SCNC: 4.3 MMOL/L (ref 3.6–5.5)
PROT SERPL-MCNC: 7.8 G/DL (ref 6–8.2)
RBC # BLD AUTO: 5.07 M/UL (ref 4.7–6.1)
SODIUM SERPL-SCNC: 143 MMOL/L (ref 135–145)
TRIGL SERPL-MCNC: 117 MG/DL (ref 0–149)
TSH SERPL DL<=0.005 MIU/L-ACNC: 0.78 UIU/ML (ref 0.38–5.33)
WBC # BLD AUTO: 7.7 K/UL (ref 4.8–10.8)

## 2021-11-17 PROCEDURE — 80053 COMPREHEN METABOLIC PANEL: CPT

## 2021-11-17 PROCEDURE — 80061 LIPID PANEL: CPT

## 2021-11-17 PROCEDURE — 76700 US EXAM ABDOM COMPLETE: CPT

## 2021-11-17 PROCEDURE — 85025 COMPLETE CBC W/AUTO DIFF WBC: CPT

## 2021-11-17 PROCEDURE — 84443 ASSAY THYROID STIM HORMONE: CPT

## 2021-11-17 PROCEDURE — 36415 COLL VENOUS BLD VENIPUNCTURE: CPT

## 2021-11-17 PROCEDURE — 76857 US EXAM PELVIC LIMITED: CPT

## 2021-11-24 ENCOUNTER — OFFICE VISIT (OUTPATIENT)
Dept: MEDICAL GROUP | Facility: PHYSICIAN GROUP | Age: 46
End: 2021-11-24
Payer: COMMERCIAL

## 2021-11-24 VITALS
SYSTOLIC BLOOD PRESSURE: 120 MMHG | BODY MASS INDEX: 23.1 KG/M2 | DIASTOLIC BLOOD PRESSURE: 74 MMHG | HEART RATE: 81 BPM | WEIGHT: 165 LBS | HEIGHT: 71 IN | TEMPERATURE: 99.1 F | OXYGEN SATURATION: 97 %

## 2021-11-24 DIAGNOSIS — Z02.89 ENCOUNTER FOR COMPLETION OF FORM WITH PATIENT: ICD-10-CM

## 2021-11-24 DIAGNOSIS — E78.00 PURE HYPERCHOLESTEROLEMIA: ICD-10-CM

## 2021-11-24 DIAGNOSIS — Z00.00 HEALTHCARE MAINTENANCE: ICD-10-CM

## 2021-11-24 PROCEDURE — 99214 OFFICE O/P EST MOD 30 MIN: CPT | Performed by: STUDENT IN AN ORGANIZED HEALTH CARE EDUCATION/TRAINING PROGRAM

## 2021-11-24 ASSESSMENT — FIBROSIS 4 INDEX: FIB4 SCORE: 0.83

## 2021-11-24 NOTE — PATIENT INSTRUCTIONS
"Below are some guidelines for managing your cholesterol levels.     1- Reduce saturated fats-these can be found in red meats and full-fat dairy products, these raise your cholesterol.  By decreasing your consumption of saturated fats they can reduce your low-density lipoprotein's (LDL's) which are the \"bad cholesterol\".    2- Eliminate transfats.  Transfats often times are listed as \"partially hydrogenated vegetable oil\" often used in margins and store-bought cookies, crackers and cakes these raise your overall cholesterol levels.    3- Eat rich foods in omega-3 fatty acids-omega-3 fatty acids do not affect the LDL cholesterol but they have other healthy benefits including reducing blood pressure. These can be found in foods such salmon, hearing, walnuts and flaxseeds.    4- Increase you soluble fiber-soluble fiber can reduce absorption of cholesterol in your bloodstream is can be found in oatmeal, kidney beans, Dennard sprouts, apples and pears.    5- Exercise most days of the week and increase your physical activity.  Moderate physical activity can help raise your high density lipoprotein's (HDL's) which is the happy cholesterol.  Exercising 5 times a week for 30 minutes a day equals 150 minutes of regular activity.  Taking a brisk walk,  riding a bicycle or even playing sport you enjoy can help this.    6- Reduce your triglycerides by utilizing a low carbohydrate diet or even a Mediterranean diet as well as being active.    7-Quit smoking.  Quitting smoking improves your HDL cholesterol levels.    8- Lose weight -Carrying even just a few extra pounds can contribute to high cholesterol.      9- Avoidance of sugar, alcohol, and fatty/fried food will help to bring these levels back to normal.      "

## 2021-11-24 NOTE — ASSESSMENT & PLAN NOTE
Chronic and ongoing HLD, but had not had labs until recently.  Not currently on medication.   Had been unaware of this.   Denies:  angina, palpitations, or dyspnea   Recent labs with LDL - 133,...  ASCVD - 7.4%...  - discussed medications, but decided to hold-off for now.  - advised on diet and exercise.   - given 9 point plan for cholesterol.   - can check labs periodically.

## 2021-11-24 NOTE — PROGRESS NOTES
Established Patient     Eyad Milton is a 46 y.o. male.    Chief Complaint   Patient presents with   • Paperwork     physical form             Problems addressed this visit:     This note uses problem-based documentation.  Subjective HPI is included in Assessment & Plan, at bottom of note.   Problem   HLD - Pure Hypercholesterolemia   Encounter for Completion of Form With Patient   Healthcare Maintenance                           Past Medical History:   Diagnosis Date   • Hernia, inguinal        Patient Active Problem List   Diagnosis   • Tobacco use   • Bilateral recurrent inguinal hernia without obstruction or gangrene   • RLQ abdominal pain   • Change in consistency of stool   • Encounter for completion of form with patient   • Healthcare maintenance   • HLD - Pure Hypercholesterolemia       History reviewed. No pertinent surgical history.    Family History   Problem Relation Age of Onset   • Cancer Mother         uncertain type; but treated.   • No Known Problems Father    • No Known Problems Daughter        Social History     Tobacco Use   • Smoking status: Current Every Day Smoker     Packs/day: 0.25     Years: 30.00     Pack years: 7.50     Types: Cigarettes   • Smokeless tobacco: Never Used   • Tobacco comment: Smokes  (prev.1/4ppd x 30 yrs... recently up to 1 ppd)....   Substance Use Topics   • Alcohol use: Yes     Comment: 3 per week       Current medications:  (including new changes):  No current outpatient medications on file.     No current facility-administered medications for this visit.       Allergies as of 11/24/2021   • (No Known Allergies)                   Review of Symptoms   (as noted elsewhere, and .....)   GEN/CONST:   Denies fever, chills,    CARDIO:   Denies chest pain, or edema.    RESP:   Denies shortness of breath, or coughing.  GI:    Denies nausea, vomiting, diarrhea,      PSYCH:  Denies anxiety, depression,           Physical Exam  /74 (BP Location: Left arm, Patient  "Position: Sitting, BP Cuff Size: Adult)   Pulse 81   Temp 37.3 °C (99.1 °F) (Temporal)   Ht 1.803 m (5' 11\")   Wt 74.8 kg (165 lb)   SpO2 97%   BMI 23.01 kg/m²   General:  Alert and oriented, No apparent distress.    Lungs: Clear to auscultation bilaterally.  No wheezes or rales.  Cardiovascular: Regular rate and rhythm.  No murmurs, rubs or gallops.  Abdomen:  Soft.  Non-tender.  No rebound or guarding.   Extremities:  No leg edema.  Good general motion of extremities.   Psychological: Appears to have normal mood and affect.        Labs:  Recent / Last-Prior labs reviewed.    CBC, CMP, Lipids and TSH       US done on 11/17/21  - Verifies bilateral inguinal hernias.   ...  (abdominal portion was without significant finding).                             Assessment & Plan  (with subjective history and orders):  Of note, the list below is not in a specific nor sortable order.      Problem List Items Addressed This Visit     Encounter for completion of form with patient     Patient presents today, with a form to be completed      for his employer, noting that he needs to have an      annual / preventative physical.…   - Reviewed (new) Labs   - Reviewed screening, and preventive measures,   - Form completed, and to be scanned in.         Healthcare maintenance     influ vaccine - 10/2021  Pneumo Vaccine - n/a  Tetanus - 8/2020   Shingles, Colonoscopy, - n/a  Dexa, Hep.C.screen, PSA - n/a   Labs < 12 mo / met screen - reviewed...         HLD - Pure Hypercholesterolemia     Chronic and ongoing HLD, but had not had labs until recently.  Not currently on medication.   Had been unaware of this.   Denies:  angina, palpitations, or dyspnea   Recent labs with LDL - 133,...  ASCVD - 7.4%...  - discussed medications, but decided to hold-off for now.  - advised on diet and exercise.   - given 9 point plan for cholesterol.   - can check labs periodically.                        Diagnosis Table, with orders:  1. HLD - Pure " Hypercholesterolemia     2. Healthcare maintenance     3. Encounter for completion of form with patient                   Follow-up:   as needed, or in 1 year  (next Fall).               A computerized dictation system may have been used in this note.    Despite review, there may be some errors in spelling or grammar.    Gil Burleson M.D.  11/24/2021

## 2021-11-25 NOTE — ASSESSMENT & PLAN NOTE
influ vaccine - 10/2021  Pneumo Vaccine - n/a  Tetanus - 8/2020   Shingles, Colonoscopy, - n/a  Dexa, Hep.C.screen, PSA - n/a   Labs < 12 mo / met screen - reviewed...

## 2021-11-25 NOTE — ASSESSMENT & PLAN NOTE
Patient presents today, with a form to be completed      for his employer, noting that he needs to have an      annual / preventative physical.…   - Reviewed (new) Labs   - Reviewed screening, and preventive measures,   - Form completed, and to be scanned in.

## 2021-12-09 ENCOUNTER — HOSPITAL ENCOUNTER (OUTPATIENT)
Dept: LAB | Facility: MEDICAL CENTER | Age: 46
End: 2021-12-09
Attending: STUDENT IN AN ORGANIZED HEALTH CARE EDUCATION/TRAINING PROGRAM
Payer: COMMERCIAL

## 2021-12-09 ENCOUNTER — OFFICE VISIT (OUTPATIENT)
Dept: MEDICAL GROUP | Facility: PHYSICIAN GROUP | Age: 46
End: 2021-12-09
Payer: COMMERCIAL

## 2021-12-09 VITALS
WEIGHT: 163 LBS | HEIGHT: 71 IN | HEART RATE: 95 BPM | OXYGEN SATURATION: 99 % | RESPIRATION RATE: 18 BRPM | TEMPERATURE: 99 F | DIASTOLIC BLOOD PRESSURE: 80 MMHG | SYSTOLIC BLOOD PRESSURE: 122 MMHG | BODY MASS INDEX: 22.82 KG/M2

## 2021-12-09 DIAGNOSIS — A64 STD (MALE): ICD-10-CM

## 2021-12-09 LAB
HIV 1+2 AB+HIV1 P24 AG SERPL QL IA: NORMAL
RPR SER QL: REACTIVE
RPR SER-TITR: NORMAL {TITER}
TREPONEMA PALLIDUM IGG+IGM AB [PRESENCE] IN SERUM OR PLASMA BY IMMUNOASSAY: REACTIVE

## 2021-12-09 PROCEDURE — 99214 OFFICE O/P EST MOD 30 MIN: CPT | Performed by: STUDENT IN AN ORGANIZED HEALTH CARE EDUCATION/TRAINING PROGRAM

## 2021-12-09 PROCEDURE — 87389 HIV-1 AG W/HIV-1&-2 AB AG IA: CPT

## 2021-12-09 PROCEDURE — 86780 TREPONEMA PALLIDUM: CPT

## 2021-12-09 PROCEDURE — 87661 TRICHOMONAS VAGINALIS AMPLIF: CPT

## 2021-12-09 PROCEDURE — 86592 SYPHILIS TEST NON-TREP QUAL: CPT

## 2021-12-09 PROCEDURE — 36415 COLL VENOUS BLD VENIPUNCTURE: CPT

## 2021-12-09 PROCEDURE — 86593 SYPHILIS TEST NON-TREP QUANT: CPT

## 2021-12-09 PROCEDURE — 87491 CHLMYD TRACH DNA AMP PROBE: CPT

## 2021-12-09 PROCEDURE — 87591 N.GONORRHOEAE DNA AMP PROB: CPT

## 2021-12-09 ASSESSMENT — FIBROSIS 4 INDEX: FIB4 SCORE: 0.83

## 2021-12-09 NOTE — ASSESSMENT & PLAN NOTE
Presents with concern for STD.  He states he was previously told his girlfriend/wife had trichomonas, so he was treated for this as well.  But in the process, he underwent some testing (at [facility]), and was later told he had chlamydia, but was not treated for this.…  Is unclear about the degree of explanation or follow-up about this.    It currently appears that he may not have been treated for an STD, or may have misunderstood the diagnosis.…  Therefore, we will repeat these tests, and screen for STDs in general.…  Additionally, patient denies any rashes, sores, or ulcers.  However, he has noted some burning sensation in his penis/urethra, more when urinating.    - Will get STD labs....  - Will notify patient of results on MyChart.

## 2021-12-09 NOTE — PATIENT INSTRUCTIONS
Please get the labs done within the next couple days.   I will try to let you know the results on [employer].     If you do not hear anything by next week, then please call the office to check on the results.   (But it may take a couple days just to get the test results).     Thank you.

## 2021-12-09 NOTE — PROGRESS NOTES
Established Patient     Eyad Milton is a 46 y.o. male.    Chief Complaint   Patient presents with   • Sexually Transmitted Diseases     Burning while urination,              Problems addressed this visit:     This note uses problem-based documentation.  Subjective HPI is included in Assessment & Plan, at bottom of note.   Problem   Std (Male)                           Past Medical History:   Diagnosis Date   • Hernia, inguinal        Patient Active Problem List   Diagnosis   • Tobacco use   • Bilateral recurrent inguinal hernia without obstruction or gangrene   • RLQ abdominal pain   • Change in consistency of stool   • Encounter for completion of form with patient   • Healthcare maintenance   • HLD - Pure Hypercholesterolemia   • STD (male)       History reviewed. No pertinent surgical history.    Family History   Problem Relation Age of Onset   • Cancer Mother         uncertain type; but treated.   • No Known Problems Father    • No Known Problems Daughter        Social History     Tobacco Use   • Smoking status: Current Every Day Smoker     Packs/day: 0.25     Years: 30.00     Pack years: 7.50     Types: Cigarettes   • Smokeless tobacco: Never Used   • Tobacco comment: Smokes  (prev.1/4ppd x 30 yrs... recently up to 1 ppd)....   Substance Use Topics   • Alcohol use: Yes     Comment: 3 per week       Current medications:  (including new changes):  No current outpatient medications on file.     No current facility-administered medications for this visit.       Allergies as of 12/09/2021   • (No Known Allergies)                   Review of Symptoms   (as noted elsewhere, and .....)   GEN/CONST:   Denies fever, chills,    CARDIO:   Denies chest pain, or edema.    RESP:   Denies shortness of breath, or coughing.  GI:    Denies nausea, vomiting, diarrhea,      :  + burning with urination...  (see HPI)....  PSYCH:  Denies anxiety, depression,           Physical Exam  /80 (BP Location: Left arm, Patient  "Position: Sitting, BP Cuff Size: Adult long)   Pulse 95   Temp 37.2 °C (99 °F) (Temporal)   Resp 18   Ht 1.803 m (5' 11\")   Wt 73.9 kg (163 lb)   SpO2 99%   BMI 22.73 kg/m²   General:  Alert and oriented, No apparent distress.  Neck: Trachea midline, no masses, no obvious thyromegaly.  Lungs: Easy / unlabored breathing, without difficulty.  Good oxygenation.   MSK:  Good general motion of extremities. Normal ambulation.    Psych:  Appears to have normal mood and affect.        Labs:  Recent / Last-Prior labs reviewed.    CBC, CMP, Lipids and TSH                             Assessment & Plan  (with subjective history and orders):  Of note, the list below is not in a specific nor sortable order.      Problem List Items Addressed This Visit     STD (male)     Presents with concern for STD.  He states he was previously told his girlfriend/wife had trichomonas, so he was treated for this as well.  But in the process, he underwent some testing (at [facility]), and was later told he had chlamydia, but was not treated for this.…  Is unclear about the degree of explanation or follow-up about this.    It currently appears that he may not have been treated for an STD, or may have misunderstood the diagnosis.…  Therefore, we will repeat these tests, and screen for STDs in general.…  Additionally, patient denies any rashes, sores, or ulcers.  However, he has noted some burning sensation in his penis/urethra, more when urinating.    - Will get STD labs....  - Will notify patient of results on MyChart.          Relevant Orders    HIV AG/AB COMBO ASSAY SCREENING    Chlamydia/GC PCR Urine Or Swab (Completed)    T.PALLIDUM AB EIA    TRICHOMONAS VAGINALIS BY TMA                    Diagnosis Table, with orders:  1. STD (male)  HIV AG/AB COMBO ASSAY SCREENING    Chlamydia/GC PCR Urine Or Swab    T.PALLIDUM AB EIA    TRICHOMONAS VAGINALIS BY TMA                 Follow-up:  as needed (for acute issues) or in 1 " year              A computerized dictation system may have been used in this note.    Despite review, there may be some errors in spelling or grammar.    Gil Burleson M.D.  12/9/2021

## 2021-12-10 ENCOUNTER — TELEPHONE (OUTPATIENT)
Dept: MEDICAL GROUP | Facility: PHYSICIAN GROUP | Age: 46
End: 2021-12-10

## 2021-12-10 ENCOUNTER — PATIENT MESSAGE (OUTPATIENT)
Dept: MEDICAL GROUP | Facility: PHYSICIAN GROUP | Age: 46
End: 2021-12-10

## 2021-12-10 LAB
C TRACH DNA SPEC QL NAA+PROBE: NEGATIVE
N GONORRHOEA DNA SPEC QL NAA+PROBE: NEGATIVE
SPECIMEN SOURCE: NORMAL

## 2021-12-10 NOTE — TELEPHONE ENCOUNTER
Both with patient and informed him that the syphilis test came back positive.    The HIV test was negative (normal).  G/C and trichomonas, still pending.…    Patient advised to go to the [facility] department, for further testing/treatment, as we do not carry IM penicillin in the office.    … Also sending my chart message, with the address and phone number for [facility], to the patient.    ... And, will fax [facility] a copy of the required reporting form, for the syphilis.    Gil Burleson M.D., 12/10/2021

## 2021-12-10 NOTE — PROGRESS NOTES
Patient previously notified on phone of being positive for syphilis.  We also sending him a Remerge[employer] message, with the address and phone number to the [facility] department, as he will need IM injections for the penicillin, for treating the syphilis.

## 2021-12-12 LAB
SPEC CONTAINER SPEC: NORMAL
SPECIMEN SOURCE: NORMAL
T VAGINALIS RRNA SPEC QL NAA+PROBE: NEGATIVE

## 2022-01-07 ENCOUNTER — PRE-ADMISSION TESTING (OUTPATIENT)
Dept: ADMISSIONS | Facility: MEDICAL CENTER | Age: 47
End: 2022-01-07
Attending: SURGERY
Payer: COMMERCIAL

## 2022-01-07 DIAGNOSIS — Z01.812 PRE-OPERATIVE LABORATORY EXAMINATION: ICD-10-CM

## 2022-01-07 ASSESSMENT — FIBROSIS 4 INDEX: FIB4 SCORE: 0.83

## 2022-01-21 ENCOUNTER — PRE-ADMISSION TESTING (OUTPATIENT)
Dept: ADMISSIONS | Facility: MEDICAL CENTER | Age: 47
End: 2022-01-21
Attending: SURGERY
Payer: COMMERCIAL

## 2022-01-21 DIAGNOSIS — Z01.812 PRE-OPERATIVE LABORATORY EXAMINATION: ICD-10-CM

## 2022-01-21 LAB — COVID ORDER STATUS COVID19: NORMAL

## 2022-01-21 PROCEDURE — U0005 INFEC AGEN DETEC AMPLI PROBE: HCPCS

## 2022-01-21 PROCEDURE — U0003 INFECTIOUS AGENT DETECTION BY NUCLEIC ACID (DNA OR RNA); SEVERE ACUTE RESPIRATORY SYNDROME CORONAVIRUS 2 (SARS-COV-2) (CORONAVIRUS DISEASE [COVID-19]), AMPLIFIED PROBE TECHNIQUE, MAKING USE OF HIGH THROUGHPUT TECHNOLOGIES AS DESCRIBED BY CMS-2020-01-R: HCPCS

## 2022-01-22 LAB
SARS-COV-2 RNA RESP QL NAA+PROBE: NOTDETECTED
SPECIMEN SOURCE: NORMAL

## 2022-01-26 ENCOUNTER — ANESTHESIA (OUTPATIENT)
Dept: SURGERY | Facility: MEDICAL CENTER | Age: 47
End: 2022-01-26
Payer: COMMERCIAL

## 2022-01-26 ENCOUNTER — HOSPITAL ENCOUNTER (OUTPATIENT)
Facility: MEDICAL CENTER | Age: 47
End: 2022-01-26
Attending: SURGERY | Admitting: SURGERY
Payer: COMMERCIAL

## 2022-01-26 ENCOUNTER — ANESTHESIA EVENT (OUTPATIENT)
Dept: SURGERY | Facility: MEDICAL CENTER | Age: 47
End: 2022-01-26
Payer: COMMERCIAL

## 2022-01-26 VITALS
BODY MASS INDEX: 22.65 KG/M2 | OXYGEN SATURATION: 95 % | RESPIRATION RATE: 16 BRPM | HEART RATE: 94 BPM | WEIGHT: 161.82 LBS | DIASTOLIC BLOOD PRESSURE: 76 MMHG | HEIGHT: 71 IN | SYSTOLIC BLOOD PRESSURE: 135 MMHG | TEMPERATURE: 98.2 F

## 2022-01-26 DIAGNOSIS — G89.18 ACUTE POSTOPERATIVE PAIN: ICD-10-CM

## 2022-01-26 PROBLEM — K40.20 BILATERAL INGUINAL HERNIA, WITHOUT OBSTRUCTION OR GANGRENE, NOT SPECIFIED AS RECURRENT: Status: ACTIVE | Noted: 2021-10-20

## 2022-01-26 PROBLEM — K40.20 BILATERAL INGUINAL HERNIA, WITHOUT OBSTRUCTION OR GANGRENE, NOT SPECIFIED AS RECURRENT: Status: RESOLVED | Noted: 2021-10-20 | Resolved: 2022-01-26

## 2022-01-26 PROCEDURE — 160025 RECOVERY II MINUTES (STATS): Performed by: SURGERY

## 2022-01-26 PROCEDURE — 160046 HCHG PACU - 1ST 60 MINS PHASE II: Performed by: SURGERY

## 2022-01-26 PROCEDURE — 700102 HCHG RX REV CODE 250 W/ 637 OVERRIDE(OP): Performed by: ANESTHESIOLOGY

## 2022-01-26 PROCEDURE — C1781 MESH (IMPLANTABLE): HCPCS | Performed by: SURGERY

## 2022-01-26 PROCEDURE — 160048 HCHG OR STATISTICAL LEVEL 1-5: Performed by: SURGERY

## 2022-01-26 PROCEDURE — 700101 HCHG RX REV CODE 250: Performed by: ANESTHESIOLOGY

## 2022-01-26 PROCEDURE — 700111 HCHG RX REV CODE 636 W/ 250 OVERRIDE (IP): Performed by: ANESTHESIOLOGY

## 2022-01-26 PROCEDURE — 700111 HCHG RX REV CODE 636 W/ 250 OVERRIDE (IP): Performed by: SURGERY

## 2022-01-26 PROCEDURE — 160041 HCHG SURGERY MINUTES - EA ADDL 1 MIN LEVEL 4: Performed by: SURGERY

## 2022-01-26 PROCEDURE — 160029 HCHG SURGERY MINUTES - 1ST 30 MINS LEVEL 4: Performed by: SURGERY

## 2022-01-26 PROCEDURE — 160002 HCHG RECOVERY MINUTES (STAT): Performed by: SURGERY

## 2022-01-26 PROCEDURE — A9270 NON-COVERED ITEM OR SERVICE: HCPCS | Performed by: ANESTHESIOLOGY

## 2022-01-26 PROCEDURE — 160009 HCHG ANES TIME/MIN: Performed by: SURGERY

## 2022-01-26 PROCEDURE — 160036 HCHG PACU - EA ADDL 30 MINS PHASE I: Performed by: SURGERY

## 2022-01-26 PROCEDURE — 501838 HCHG SUTURE GENERAL: Performed by: SURGERY

## 2022-01-26 PROCEDURE — 502714 HCHG ROBOTIC SURGERY SERVICES: Performed by: SURGERY

## 2022-01-26 PROCEDURE — 700101 HCHG RX REV CODE 250: Performed by: SURGERY

## 2022-01-26 PROCEDURE — 700105 HCHG RX REV CODE 258: Performed by: SURGERY

## 2022-01-26 PROCEDURE — 500868 HCHG NEEDLE, SURGI(VARES): Performed by: SURGERY

## 2022-01-26 PROCEDURE — 160035 HCHG PACU - 1ST 60 MINS PHASE I: Performed by: SURGERY

## 2022-01-26 DEVICE — MESH PROGRIP LAPROSCOPIC SELF FIXATING (1/CA): Type: IMPLANTABLE DEVICE | Status: FUNCTIONAL

## 2022-01-26 RX ORDER — ROCURONIUM BROMIDE 10 MG/ML
INJECTION, SOLUTION INTRAVENOUS PRN
Status: DISCONTINUED | OUTPATIENT
Start: 2022-01-26 | End: 2022-01-26 | Stop reason: SURG

## 2022-01-26 RX ORDER — ONDANSETRON 2 MG/ML
4 INJECTION INTRAMUSCULAR; INTRAVENOUS
Status: DISCONTINUED | OUTPATIENT
Start: 2022-01-26 | End: 2022-01-26 | Stop reason: HOSPADM

## 2022-01-26 RX ORDER — LIDOCAINE HYDROCHLORIDE 20 MG/ML
INJECTION, SOLUTION EPIDURAL; INFILTRATION; INTRACAUDAL; PERINEURAL PRN
Status: DISCONTINUED | OUTPATIENT
Start: 2022-01-26 | End: 2022-01-26 | Stop reason: SURG

## 2022-01-26 RX ORDER — OXYCODONE HYDROCHLORIDE 5 MG/1
5 TABLET ORAL EVERY 6 HOURS PRN
Qty: 12 TABLET | Refills: 0 | Status: SHIPPED | OUTPATIENT
Start: 2022-01-26 | End: 2022-01-29

## 2022-01-26 RX ORDER — HYDROMORPHONE HYDROCHLORIDE 1 MG/ML
0.1 INJECTION, SOLUTION INTRAMUSCULAR; INTRAVENOUS; SUBCUTANEOUS
Status: DISCONTINUED | OUTPATIENT
Start: 2022-01-26 | End: 2022-01-26 | Stop reason: HOSPADM

## 2022-01-26 RX ORDER — SODIUM CHLORIDE, SODIUM LACTATE, POTASSIUM CHLORIDE, CALCIUM CHLORIDE 600; 310; 30; 20 MG/100ML; MG/100ML; MG/100ML; MG/100ML
INJECTION, SOLUTION INTRAVENOUS CONTINUOUS
Status: ACTIVE | OUTPATIENT
Start: 2022-01-26 | End: 2022-01-26

## 2022-01-26 RX ORDER — MEPERIDINE HYDROCHLORIDE 25 MG/ML
12.5 INJECTION INTRAMUSCULAR; INTRAVENOUS; SUBCUTANEOUS
Status: DISCONTINUED | OUTPATIENT
Start: 2022-01-26 | End: 2022-01-26 | Stop reason: HOSPADM

## 2022-01-26 RX ORDER — ACETAMINOPHEN 500 MG
1000 TABLET ORAL EVERY 6 HOURS PRN
Status: DISCONTINUED | OUTPATIENT
Start: 2022-01-26 | End: 2022-01-26 | Stop reason: HOSPADM

## 2022-01-26 RX ORDER — OXYCODONE HCL 5 MG/5 ML
5 SOLUTION, ORAL ORAL
Status: COMPLETED | OUTPATIENT
Start: 2022-01-26 | End: 2022-01-26

## 2022-01-26 RX ORDER — KETOROLAC TROMETHAMINE 30 MG/ML
INJECTION, SOLUTION INTRAMUSCULAR; INTRAVENOUS PRN
Status: DISCONTINUED | OUTPATIENT
Start: 2022-01-26 | End: 2022-01-26 | Stop reason: SURG

## 2022-01-26 RX ORDER — ONDANSETRON 2 MG/ML
INJECTION INTRAMUSCULAR; INTRAVENOUS PRN
Status: DISCONTINUED | OUTPATIENT
Start: 2022-01-26 | End: 2022-01-26 | Stop reason: SURG

## 2022-01-26 RX ORDER — LIDOCAINE HYDROCHLORIDE 40 MG/ML
SOLUTION TOPICAL PRN
Status: DISCONTINUED | OUTPATIENT
Start: 2022-01-26 | End: 2022-01-26 | Stop reason: SURG

## 2022-01-26 RX ORDER — BUPIVACAINE HYDROCHLORIDE AND EPINEPHRINE 5; 5 MG/ML; UG/ML
INJECTION, SOLUTION EPIDURAL; INTRACAUDAL; PERINEURAL
Status: DISCONTINUED | OUTPATIENT
Start: 2022-01-26 | End: 2022-01-26 | Stop reason: HOSPADM

## 2022-01-26 RX ORDER — IBUPROFEN 800 MG/1
800 TABLET ORAL
Qty: 21 TABLET | Refills: 0 | Status: SHIPPED | OUTPATIENT
Start: 2022-01-26 | End: 2022-02-02

## 2022-01-26 RX ORDER — HYDROMORPHONE HYDROCHLORIDE 1 MG/ML
0.4 INJECTION, SOLUTION INTRAMUSCULAR; INTRAVENOUS; SUBCUTANEOUS
Status: DISCONTINUED | OUTPATIENT
Start: 2022-01-26 | End: 2022-01-26 | Stop reason: HOSPADM

## 2022-01-26 RX ORDER — ACETAMINOPHEN 500 MG
1000 TABLET ORAL EVERY 6 HOURS
Qty: 56 TABLET | Refills: 0 | Status: SHIPPED | OUTPATIENT
Start: 2022-01-26 | End: 2022-02-02

## 2022-01-26 RX ORDER — OXYCODONE HCL 5 MG/5 ML
10 SOLUTION, ORAL ORAL
Status: COMPLETED | OUTPATIENT
Start: 2022-01-26 | End: 2022-01-26

## 2022-01-26 RX ORDER — HYDRALAZINE HYDROCHLORIDE 20 MG/ML
5 INJECTION INTRAMUSCULAR; INTRAVENOUS
Status: DISCONTINUED | OUTPATIENT
Start: 2022-01-26 | End: 2022-01-26 | Stop reason: HOSPADM

## 2022-01-26 RX ORDER — DEXAMETHASONE SODIUM PHOSPHATE 4 MG/ML
INJECTION, SOLUTION INTRA-ARTICULAR; INTRALESIONAL; INTRAMUSCULAR; INTRAVENOUS; SOFT TISSUE PRN
Status: DISCONTINUED | OUTPATIENT
Start: 2022-01-26 | End: 2022-01-26 | Stop reason: SURG

## 2022-01-26 RX ORDER — HYDROMORPHONE HYDROCHLORIDE 1 MG/ML
0.2 INJECTION, SOLUTION INTRAMUSCULAR; INTRAVENOUS; SUBCUTANEOUS
Status: DISCONTINUED | OUTPATIENT
Start: 2022-01-26 | End: 2022-01-26 | Stop reason: HOSPADM

## 2022-01-26 RX ORDER — CEFAZOLIN SODIUM 1 G/3ML
INJECTION, POWDER, FOR SOLUTION INTRAMUSCULAR; INTRAVENOUS PRN
Status: DISCONTINUED | OUTPATIENT
Start: 2022-01-26 | End: 2022-01-26 | Stop reason: SURG

## 2022-01-26 RX ORDER — HALOPERIDOL 5 MG/ML
1 INJECTION INTRAMUSCULAR
Status: DISCONTINUED | OUTPATIENT
Start: 2022-01-26 | End: 2022-01-26 | Stop reason: HOSPADM

## 2022-01-26 RX ORDER — DIPHENHYDRAMINE HYDROCHLORIDE 50 MG/ML
6.25 INJECTION INTRAMUSCULAR; INTRAVENOUS
Status: DISCONTINUED | OUTPATIENT
Start: 2022-01-26 | End: 2022-01-26 | Stop reason: HOSPADM

## 2022-01-26 RX ORDER — SODIUM CHLORIDE, SODIUM LACTATE, POTASSIUM CHLORIDE, CALCIUM CHLORIDE 600; 310; 30; 20 MG/100ML; MG/100ML; MG/100ML; MG/100ML
INJECTION, SOLUTION INTRAVENOUS CONTINUOUS
Status: DISCONTINUED | OUTPATIENT
Start: 2022-01-26 | End: 2022-01-26 | Stop reason: HOSPADM

## 2022-01-26 RX ORDER — MIDAZOLAM HYDROCHLORIDE 1 MG/ML
INJECTION INTRAMUSCULAR; INTRAVENOUS PRN
Status: DISCONTINUED | OUTPATIENT
Start: 2022-01-26 | End: 2022-01-26 | Stop reason: SURG

## 2022-01-26 RX ADMIN — FENTANYL CITRATE 100 MCG: 50 INJECTION, SOLUTION INTRAMUSCULAR; INTRAVENOUS at 11:09

## 2022-01-26 RX ADMIN — MIDAZOLAM HYDROCHLORIDE 2 MG: 1 INJECTION, SOLUTION INTRAMUSCULAR; INTRAVENOUS at 11:06

## 2022-01-26 RX ADMIN — KETOROLAC TROMETHAMINE 30 MG: 30 INJECTION, SOLUTION INTRAMUSCULAR at 12:07

## 2022-01-26 RX ADMIN — ONDANSETRON 4 MG: 2 INJECTION INTRAMUSCULAR; INTRAVENOUS at 12:01

## 2022-01-26 RX ADMIN — FENTANYL CITRATE 50 MCG: 50 INJECTION, SOLUTION INTRAMUSCULAR; INTRAVENOUS at 12:01

## 2022-01-26 RX ADMIN — PROPOFOL 200 MG: 10 INJECTION, EMULSION INTRAVENOUS at 11:09

## 2022-01-26 RX ADMIN — DEXAMETHASONE SODIUM PHOSPHATE 8 MG: 4 INJECTION, SOLUTION INTRA-ARTICULAR; INTRALESIONAL; INTRAMUSCULAR; INTRAVENOUS; SOFT TISSUE at 11:14

## 2022-01-26 RX ADMIN — FENTANYL CITRATE 50 MCG: 50 INJECTION, SOLUTION INTRAMUSCULAR; INTRAVENOUS at 11:41

## 2022-01-26 RX ADMIN — LIDOCAINE HYDROCHLORIDE 4 ML: 40 SOLUTION TOPICAL at 11:09

## 2022-01-26 RX ADMIN — OXYCODONE HYDROCHLORIDE 10 MG: 5 SOLUTION ORAL at 12:46

## 2022-01-26 RX ADMIN — SUGAMMADEX 200 MG: 100 INJECTION, SOLUTION INTRAVENOUS at 12:10

## 2022-01-26 RX ADMIN — SODIUM CHLORIDE, POTASSIUM CHLORIDE, SODIUM LACTATE AND CALCIUM CHLORIDE: 600; 310; 30; 20 INJECTION, SOLUTION INTRAVENOUS at 09:30

## 2022-01-26 RX ADMIN — CEFAZOLIN 2 G: 330 INJECTION, POWDER, FOR SOLUTION INTRAMUSCULAR; INTRAVENOUS at 11:09

## 2022-01-26 RX ADMIN — METHOCARBAMOL 1000 MG: 100 INJECTION INTRAMUSCULAR; INTRAVENOUS at 12:41

## 2022-01-26 RX ADMIN — ROCURONIUM BROMIDE 50 MG: 10 INJECTION, SOLUTION INTRAVENOUS at 11:09

## 2022-01-26 RX ADMIN — LIDOCAINE HYDROCHLORIDE 60 MG: 20 INJECTION, SOLUTION EPIDURAL; INFILTRATION; INTRACAUDAL at 11:09

## 2022-01-26 ASSESSMENT — PAIN DESCRIPTION - PAIN TYPE
TYPE: SURGICAL PAIN

## 2022-01-26 ASSESSMENT — PAIN SCALES - GENERAL: PAIN_LEVEL: 4

## 2022-01-26 ASSESSMENT — FIBROSIS 4 INDEX: FIB4 SCORE: 0.83

## 2022-01-26 NOTE — ANESTHESIA TIME REPORT
Anesthesia Start and Stop Event Times     Date Time Event    1/26/2022 1040 Ready for Procedure     1106 Anesthesia Start     1219 Anesthesia Stop        Responsible Staff  01/26/22    Name Role Begin End    Kirk Recinos M.D. Anesth 1106 1219        Preop Diagnosis (Free Text):  Pre-op Diagnosis     BILATERAL INGUINAL HERNIAS        Preop Diagnosis (Codes):    Premium Reason  Non-Premium    Comments:

## 2022-01-26 NOTE — OP REPORT
Operative Report    Date: 1/26/2022    PreOp Diagnosis:   1.  Bilateral inguinal hernia    PostOp Diagnosis: Same    Procedure(s):  REPAIR, HERNIA, INGUINAL, bilateral, ROBOT-ASSISTED, USING DA CHEIKH XI - WITH MESH - Wound Class: Clean    Surgeon(s):  Ra Cervantes M.D.    Assistant:   EVE Hedrick  (a surgical first assistant was required for the entire procedure for patient positioning, placement of incisions, management of the robotic equipment, retraction of critical structures, closure of incisions)    Anesthesiologist/Type of Anesthesia:  Anesthesiologist: Kirk Recinos M.D./General    Surgical Staff:  Circulator: Isa Anna R.N.  Relief Circulator: Jos Dai R.N.  Relief Scrub: Austyn Lubin  Scrub Person: Hilda Johnson    Specimens removed if any:  * No specimens in log *    Estimated Blood Loss: 15 mL    Findings: Large right and modest left indirect inguinal hernia    Complications: None noted    Outcome: Transferred to PACU in stable condition    Indications:  38-year-old male presenting with approximately 20-year history of enlarging right and left inguinal hernia which were becoming increasingly symptomatic.  This procedure above was discussed with him in detail include the risk benefits, alternatives, and he wished to proceed.    Procedure in detail:   The patient was brought to the operating room and was placed in the supine position where general endotracheal anesthesia was induced. SCDs were in place and functioning.  Preoperative antibiotics of ancef were given before incision time. The patient's abdomen was prepped and draped in the usual sterile fashion.     After infiltration of local anesthetic, a skin incision was made to accommodate an 8 mm robotic port in midline, approximately 2 cm above the umbilicus.  A Veress needle was used to access the peritoneum, and the abdomen was insufflated to 15 mmHg, which the patient tolerated well.  The laparoscopic camera was then  introduced and the abdomen inspected for evidence of trauma secondary to Veress needle or port placement, and found none.  The peritoneal space was examined, and there were no adhesions.  Two more 8 mm ports were placed, one in the right upper quadrant one in the left upper quadrant.       The patient was then placed in approximately 12 degrees of Trendelenburg, and robot brought in and docked.  The robotic endoscope was introduced and targeting completed, then the remaining robotic arms were then attached, and the working instruments were brought in under direct vision.     Bilateral direct inguinal hernia were noted.   A transverse peritoneal flap was then created several centimeters above the visible right lower quadrant hernia, starting at the right lateral abdominal wall and extending to the right medial umbilical fold.  Care was taken to ensure that the peritoneal flap was dissected down low enough for the placement of a 10 x 15 cm Progrip mesh.  The dissection was carried medial to expose the pubic bone, Sylvain's ligament medially.  The cord structures and cord vessels were then  from the hernia sac using careful combination of blunt and electrocautery dissection.  The peritoneal sac was carefully removed from the hernia defect.  The hernia sac was very large and there was significant adherance between the sac and the cord structures.  Hemostasis was controlled with careful electrocautery and hemoblast.  Once the appropriate dissection size was created, the appropriate mesh was inserted.  Care was taken to ensure that it was flat across the pelvic floor, covering the hernia defect, and the posterior edge was not curled up, but was behind the inferior peritoneal edge.  Hemostasis was ensured.       Next, a transverse peritoneal flap was then created several centimeters above the visible left lower quadrant hernia, starting at the left medial umbilical fold extending to the left lateral abdominal wall.   Care was taken to ensure that the peritoneal flap was dissected down low enough for the placement of a 10 x 15 cm Progrip mesh.  The dissection was carried medial to expose the pubic bone, Sylvain's ligament medially.  The cord structures and cord vessels were then  from the hernia sac using careful combination of blunt and electrocautery dissection.  The peritoneal sac was carefully removed from the hernia defect.  The hernia sac was modest in size, but there was a large left sided cord lipoma.  Once the appropriate dissection size was created, the appropriate mesh was inserted.  Care was taken to ensure that it was flat across the pelvic floor, covering the hernia defect, and the posterior edge was not curled up, but was behind the inferior peritoneal edge.  Hemostasis was ensured.       The peritoneum was then closed using 2-0 stratafix suture in a running fashion.  Care was taken to close all the peritoneal defects.  Patient was the trochars were then removed under direct vision, and all incisions closed with interrupted 4-0 Vicryl and Dermabond.     All sponge, needle, and instrument counts were reported as correct at the end of the procedure. The patient tolerated the procedure well and left the operating room for the recovery room in stable and satisfactory condition.     Ra Cervantes M.D.  [facility]  [phone]      1/26/2022 12:16 PM Ra Cervantes M.D.

## 2022-01-26 NOTE — PROGRESS NOTES
Med rec complete per pt at bedside  Interviewed pt with family at bedside    Allergies reviewed and updated.

## 2022-01-26 NOTE — DISCHARGE INSTRUCTIONS
Hernia Repair Discharge Instructions:    1.  ACTIVITIES: Upon discharge from the hospital, the day of surgery it is requested that you do no significant physical activity and limit mental activities, as you have had sedation. The day after surgery, you may resume activities of daily living, but for four weeks, it is recommended that you do no strenuous activities or heavy lifting (greater than 15 pounds).     2.  DRIVING: You may drive whenever you are off pain medications and are able to perform the activities needed to drive, i.e. turning, bending, twisting, etc.     3.  WOUND: It is not unusual for patients to experience swelling and even bruising at the hernia repair site. With inguinal hernias, sometimes the bruising and swelling may occur at incisions sites, or extend on to the penis or into the scrotum of male patients. This will resolve over the next few days.     4.  ICE: please use ice on the wound to decrease the swelling for the first 24 hours and then discontinue.     5.  BATHING: The dressing can be removed two days after surgery and the wound can then be wetted in a shower as normal, but avoid submersion in water (tub bath) for at least a week.    6.  PAIN MEDICATION: You will be given a prescription for pain medication at discharge. Please take these as directed. It is important to remember not to take medications on an empty stomach as this may cause nausea.     7.  BOWEL FUNCTION: After hernia repair, it is not uncommon for patients to experience constipation. This is due to decreasing activity levels as well as pain medications. You may wish to use a stool softener beginning immediately after surgery, and you may or may not need to use a laxative (Milk of Magnesia, Ex-lax; Senokot, etc.) as well.     8.  CALL IF YOU HAVE: (1) Fevers to more than 1010 F, (2) Unusual chest or leg pain, (3) Drainage or fluid from incision that may be foul smelling, increased tenderness or soreness at the wound or the  wound edges are no longer together,redness or swelling at the incision site. Please do not hesitate to call with any other questions.     9. APPOINTMENT: Contact our office at [phone] for a follow-up appointment in 1 to 2 weeks following your procedure.     If you have any additional questions, please do not hesitate to call the office and speak to either myself or the physician on call.     Office address:  [address] [place], [place] [address]e [facility]. [place], [place] [address]    Ra Cervantes M.D.  [facility]  [phone]      ACTIVITY: Rest and take it easy for the first 24 hours.  A responsible adult is recommended to remain with you during that time.  It is normal to feel sleepy.  We encourage you to not do anything that requires balance, judgment or coordination.    MILD FLU-LIKE SYMPTOMS ARE NORMAL. YOU MAY EXPERIENCE GENERALIZED MUSCLE ACHES, THROAT IRRITATION, HEADACHE AND/OR SOME NAUSEA.    FOR 24 HOURS DO NOT:  Drive, operate machinery or run household appliances.  Drink beer or alcoholic beverages.   Make important decisions or sign legal documents.    DIET: To avoid nausea, slowly advance diet as tolerated, avoiding spicy or greasy foods for the first day.  Add more substantial food to your diet according to your physician's instructions.  Babies can be fed formula or breast milk as soon as they are hungry.  INCREASE FLUIDS AND FIBER TO AVOID CONSTIPATION.    You should CALL YOUR PHYSICIAN if you develop:  Fever greater than 101 degrees F.  Pain not relieved by medication, or persistent nausea or vomiting.  Excessive bleeding (blood soaking through dressing) or unexpected drainage from the wound.  Extreme redness or swelling around the incision site, drainage of pus or foul smelling drainage.  Inability to urinate or empty your bladder within 8 hours.  Problems with breathing or chest pain.    You should call 911 if you develop problems with breathing or chest pain.  If you are unable to contact your doctor or  surgical center, you should go to the nearest emergency room or urgent care center.  Physician's telephone #: Dr. Cervantes, [phone]    If any questions arise, call your doctor.  If your doctor is not available, please feel free to call the Surgical Center at [phone].     A registered nurse may call you a few days after your surgery to see how you are doing after your procedure.    MEDICATIONS: Resume taking daily medication.  Take prescribed pain medication with food.  If no medication is prescribed, you may take non-aspirin pain medication if needed.  PAIN MEDICATION CAN BE VERY CONSTIPATING.  Take a stool softener or laxative such as senokot, pericolace, or milk of magnesia if needed.    Prescription given for tylenol, motrin, roxicodone  Last pain medication given at 12:46pm    If your physician has prescribed pain medication that includes Acetaminophen (Tylenol), do not take additional Acetaminophen (Tylenol) while taking the prescribed medication.    Depression / Suicide Risk    As you are discharged from this [facility] facility, it is important to learn how to keep safe from harming yourself.    Recognize the warning signs:  · Abrupt changes in personality, positive or negative- including increase in energy   · Giving away possessions  · Change in eating patterns- significant weight changes-  positive or negative  · Change in sleeping patterns- unable to sleep or sleeping all the time   · Unwillingness or inability to communicate  · Depression  · Unusual sadness, discouragement and loneliness  · Talk of wanting to die  · Neglect of personal appearance   · Rebelliousness- reckless behavior  · Withdrawal from people/activities they love  · Confusion- inability to concentrate     If you or a loved one observes any of these behaviors or has concerns about self-harm, here's what you can do:  · Talk about it- your feelings and reasons for harming yourself  · Remove any means that you might use to hurt  yourself (examples: pills, rope, extension cords, firearm)  · Get professional help from the community (Mental Health, Substance Abuse, psychological counseling)  · Do not be alone:Call your Safe Contact- someone whom you trust who will be there for you.  · Call your local CRISIS HOTLINE 113-0355 or 016-602-0539  · Call your local Children's Mobile Crisis Response Team [place] (143) 229-3367 or www.Shoeboxed  · Call the toll free National Suicide Prevention Hotlines   · National Suicide Prevention Lifeline 128-889-FAHG (5550)  · National Hope Line Network 800-SUICIDE (998-3681)

## 2022-01-26 NOTE — ANESTHESIA POSTPROCEDURE EVALUATION
Patient: Eyad Milton    Procedure Summary     Date: 01/26/22 Room / Location: [facility] 11 / SURGERY [facility]    Anesthesia Start: 1106 Anesthesia Stop: 1219    Procedure: REPAIR, HERNIA, INGUINAL, ROBOT-ASSISTED, USING DA CHEIKH XI - WITH MESH (Bilateral Abdomen) Diagnosis: (BILATERAL INGUINAL HERNIAS)    Surgeons: Ra Cervantes M.D. Responsible Provider: Kirk Recinos M.D.    Anesthesia Type: general ASA Status: 2          Final Anesthesia Type: general  Last vitals  BP   Blood Pressure: 117/62    Temp   36.8 °C (98.2 °F)    Pulse   82   Resp   15    SpO2   98 %      Anesthesia Post Evaluation    Patient location during evaluation: PACU  Patient participation: complete - patient participated  Level of consciousness: awake and alert  Pain score: 4    Airway patency: patent  Anesthetic complications: no  Cardiovascular status: hemodynamically stable  Respiratory status: acceptable  Hydration status: euvolemic    PONV: none          No complications documented.

## 2022-01-26 NOTE — ANESTHESIA PREPROCEDURE EVALUATION
Case: [id number] Date/Time: 01/26/22 1115    Procedure: REPAIR, HERNIA, INGUINAL, ROBOT-ASSISTED, USING DA CHEIKH XI - WITH MESH (Bilateral )    Pre-op diagnosis: BILATERAL INGUINAL HERNIAS    Location: [facility] OR 11 / SURGERY [facility]    Surgeons: Ra Cervantes M.D.          Relevant Problems   Other   (positive) Bilateral recurrent inguinal hernia without obstruction or gangrene   (positive) Tobacco use       Physical Exam    Airway   Mallampati: II  TM distance: >3 FB  Neck ROM: full       Cardiovascular - normal exam  Rhythm: regular  Rate: normal  (-) murmur     Dental - normal exam           Pulmonary - normal exam  Breath sounds clear to auscultation     Abdominal    Neurological - normal exam                 Anesthesia Plan    ASA 2       Plan - general       Airway plan will be ETT          Induction: intravenous    Postoperative Plan: Postoperative administration of opioids is intended.    Pertinent diagnostic labs and testing reviewed    Informed Consent:    Anesthetic plan and risks discussed with patient.

## 2022-01-26 NOTE — OR NURSING
Patient is awake and alert and tolerating water without issue. VSS. 3 lap sites to abd are closed with dermabond and CDI. Pt has been updated on plan of care and all questions have been addressed. Pt wife called and updated on pt status.     Pt pain and nausea tolerable. Report called to Irish CARTER. Pt taken to stage 2 in stable condition.

## 2022-01-26 NOTE — ANESTHESIA PROCEDURE NOTES
Airway    Date/Time: 1/26/2022 11:09 AM  Performed by: Kirk Recinos M.D.  Authorized by: Kirk Recinos M.D.     Location:  OR  Urgency:  Elective  Difficult Airway: No    Indications for Airway Management:  Anesthesia      Spontaneous Ventilation: absent    Sedation Level:  Deep  Preoxygenated: Yes    Patient Position:  Sniffing  Mask Difficulty Assessment:  1 - vent by mask  Final Airway Type:  Endotracheal airway  Final Endotracheal Airway:  ETT  Cuffed: Yes    Technique Used for Successful ETT Placement:  Direct laryngoscopy    Insertion Site:  Oral  Blade Type:  Kavya  Laryngoscope Blade/Videolaryngoscope Blade Size:  3  ETT Size (mm):  7.5  Measured from:  Teeth  ETT to Teeth (cm):  23  Placement Verified by: auscultation and capnometry    Cormack-Lehane Classification:  Grade IIb - view of arytenoids or posterior of glottis only  Number of Attempts at Approach:  1

## 2022-08-22 ENCOUNTER — HOSPITAL ENCOUNTER (OUTPATIENT)
Dept: RADIOLOGY | Facility: MEDICAL CENTER | Age: 47
End: 2022-08-22
Attending: EMERGENCY MEDICINE
Payer: COMMERCIAL

## 2022-08-22 ENCOUNTER — OFFICE VISIT (OUTPATIENT)
Dept: URGENT CARE | Facility: PHYSICIAN GROUP | Age: 47
End: 2022-08-22
Payer: COMMERCIAL

## 2022-08-22 VITALS
WEIGHT: 170 LBS | HEART RATE: 72 BPM | DIASTOLIC BLOOD PRESSURE: 74 MMHG | HEIGHT: 71 IN | OXYGEN SATURATION: 100 % | TEMPERATURE: 98.1 F | BODY MASS INDEX: 23.8 KG/M2 | SYSTOLIC BLOOD PRESSURE: 124 MMHG

## 2022-08-22 DIAGNOSIS — S46.912A SHOULDER STRAIN, LEFT, INITIAL ENCOUNTER: ICD-10-CM

## 2022-08-22 PROCEDURE — 73030 X-RAY EXAM OF SHOULDER: CPT | Mod: LT

## 2022-08-22 PROCEDURE — 99213 OFFICE O/P EST LOW 20 MIN: CPT | Performed by: EMERGENCY MEDICINE

## 2022-08-22 ASSESSMENT — ENCOUNTER SYMPTOMS
NUMBNESS: 0
TINGLING: 0
FOCAL WEAKNESS: 0
NECK PAIN: 0

## 2022-08-22 ASSESSMENT — FIBROSIS 4 INDEX: FIB4 SCORE: 0.83

## 2022-08-22 NOTE — PROGRESS NOTES
"Subjective     Eyad Milton is a 46 y.o. male who presents with Other (Left shoulder pain)            Shoulder Injury   The incident occurred at home. The left shoulder is affected. The incident occurred 2 days ago. Injury mechanism: sudden increased load while carrying. Pertinent negatives include no numbness or tingling. The symptoms are aggravated by movement and overhead lifting.   No significant prior injury known.  Onset associated with helping carry a heavy object and he missed stepped, causing increased load shift to left arm.  Review of Systems   Musculoskeletal:  Negative for neck pain.   Neurological:  Negative for tingling, focal weakness and numbness.            Objective     /74   Pulse 72   Temp 36.7 °C (98.1 °F) (Temporal)   Ht 1.803 m (5' 11\")   Wt 77.1 kg (170 lb)   SpO2 100%   BMI 23.71 kg/m²      Physical Exam  Constitutional:       General: He is awake.      Appearance: Normal appearance. He is well-developed.   Cardiovascular:      Pulses:           Radial pulses are 2+ on the left side.   Musculoskeletal:      Left shoulder: Tenderness and bony tenderness present. No swelling, deformity, effusion or crepitus. Decreased range of motion.      Left upper arm: No swelling, edema or tenderness.      Cervical back: Normal range of motion and neck supple. No muscular tenderness.      Comments: Diffuse tenderness left anterolateral deltoid region.  No focal rotator cuff deficit.  Negative Speed.   Skin:     General: Skin is warm and dry.      Findings: No rash or wound.   Neurological:      Mental Status: He is alert.   Psychiatric:         Behavior: Behavior is cooperative.                           Assessment & Plan        1. Shoulder strain, left, initial encounter  Ice, OTC NSAID as needed.  - DX-SHOULDER 2+ LEFT; Interpretation per radiologist:    Unremarkable left shoulder radiographs.   REF SPORTS MED             "

## 2022-08-22 NOTE — LETTER
[facility] URGENT CARE [place]  [address] [address]     August 22, 2022    Patient: Eyad Milton   YOB: 1975   Date of Visit: 8/22/2022       To Whom It May Concern:    Eyad Milton has work restriction from 8/22-26, 2022.  No left arm lifting, push/pull, overhead activity.    Sincerely,     Ra Ramires M.D.

## 2022-10-26 ENCOUNTER — OFFICE VISIT (OUTPATIENT)
Dept: URGENT CARE | Facility: PHYSICIAN GROUP | Age: 47
End: 2022-10-26
Payer: COMMERCIAL

## 2022-10-26 ENCOUNTER — HOSPITAL ENCOUNTER (OUTPATIENT)
Dept: RADIOLOGY | Facility: MEDICAL CENTER | Age: 47
End: 2022-10-26
Attending: PHYSICIAN ASSISTANT
Payer: COMMERCIAL

## 2022-10-26 VITALS
OXYGEN SATURATION: 91 % | RESPIRATION RATE: 18 BRPM | SYSTOLIC BLOOD PRESSURE: 130 MMHG | DIASTOLIC BLOOD PRESSURE: 70 MMHG | HEART RATE: 102 BPM | HEIGHT: 71 IN | WEIGHT: 165 LBS | BODY MASS INDEX: 23.1 KG/M2 | TEMPERATURE: 99.3 F

## 2022-10-26 DIAGNOSIS — R05.1 ACUTE COUGH: ICD-10-CM

## 2022-10-26 DIAGNOSIS — R06.02 SHORTNESS OF BREATH: ICD-10-CM

## 2022-10-26 DIAGNOSIS — R00.0 TACHYCARDIA: ICD-10-CM

## 2022-10-26 DIAGNOSIS — J18.9 PNEUMONIA OF BOTH LOWER LOBES DUE TO INFECTIOUS ORGANISM: ICD-10-CM

## 2022-10-26 PROCEDURE — 71046 X-RAY EXAM CHEST 2 VIEWS: CPT

## 2022-10-26 PROCEDURE — 99214 OFFICE O/P EST MOD 30 MIN: CPT | Performed by: PHYSICIAN ASSISTANT

## 2022-10-26 RX ORDER — BENZONATATE 100 MG/1
100 CAPSULE ORAL 3 TIMES DAILY PRN
Qty: 20 CAPSULE | Refills: 0 | Status: SHIPPED | OUTPATIENT
Start: 2022-10-26 | End: 2023-04-20

## 2022-10-26 RX ORDER — AMOXICILLIN AND CLAVULANATE POTASSIUM 875; 125 MG/1; MG/1
1 TABLET, FILM COATED ORAL 2 TIMES DAILY
Qty: 10 TABLET | Refills: 0 | Status: SHIPPED | OUTPATIENT
Start: 2022-10-26 | End: 2022-10-31

## 2022-10-26 RX ORDER — ALBUTEROL SULFATE 90 UG/1
1-2 AEROSOL, METERED RESPIRATORY (INHALATION) EVERY 4 HOURS PRN
Qty: 1 EACH | Refills: 0 | Status: SHIPPED | OUTPATIENT
Start: 2022-10-26 | End: 2023-04-20

## 2022-10-26 RX ORDER — AZITHROMYCIN 250 MG/1
TABLET, FILM COATED ORAL
Qty: 6 TABLET | Refills: 0 | Status: SHIPPED | OUTPATIENT
Start: 2022-10-26 | End: 2023-04-20

## 2022-10-26 ASSESSMENT — FIBROSIS 4 INDEX: FIB4 SCORE: 0.85

## 2022-10-26 NOTE — LETTER
October 26, 2022    To Whom It May Concern:         This is confirmation that Eyad Juniorjuan carlos Milton attended his scheduled appointment with Savanna Piña P.A.-C. on 10/26/22.  Please excuse patient from work 10/27 and 10/28.         If you have any questions please do not hesitate to call me at the phone number listed below.    Sincerely,          Savanna Piña P.A.-C.  [phone]

## 2022-10-26 NOTE — PROGRESS NOTES
"Subjective:   Eyad Milton is a 47 y.o. male who presents for Cough (X2 weeks, at night, dry cough, was sick 2 weeks ago not anymore, cough is only remaining symptom )   URI sxs x 2 weeks.  Cough, severe in nature.  Has had fevers to 102.  Fatigue, myalgias.  Cough dry.  Feels SOB.No underlying respiratory issues.    Medications:  This patient does not have an active medication from one of the medication groupers.    Allergies:             Patient has no known allergies.    Surgical History:         Past Surgical History:   Procedure Laterality Date    INGUINAL HERNIA REPAIR ROBOTIC XI Bilateral 1/26/2022    Procedure: REPAIR, HERNIA, INGUINAL, ROBOT-ASSISTED, USING DA CHEIKH XI - WITH MESH;  Surgeon: Ra Cervantes M.D.;  Location: SURGERY [facility];  Service: Gen Robotic       Past Social Hx:  Eyad Milton  reports that he has been smoking cigarettes. He has a 7.50 pack-year smoking history. He has never used smokeless tobacco. He reports current alcohol use. He reports that he does not use drugs.     Past Family Hx:   Eyad Milton family history includes Cancer in his mother; No Known Problems in his daughter and father.       Problem list, medications, and allergies reviewed by myself today in Epic.     Objective:     /70 (BP Location: Left arm, Patient Position: Sitting, BP Cuff Size: Adult)   Pulse (!) 102   Temp 37.4 °C (99.3 °F) (Temporal)   Resp 18   Ht 1.803 m (5' 11\")   Wt 74.8 kg (165 lb)   SpO2 91%   BMI 23.01 kg/m²     Physical Exam  Constitutional:       General: He is not in acute distress.     Appearance: He is well-developed. He is not diaphoretic.   HENT:      Head: Normocephalic.      Right Ear: Tympanic membrane, ear canal and external ear normal.      Left Ear: Tympanic membrane, ear canal and external ear normal.      Nose: Mucosal edema and rhinorrhea present.      Mouth/Throat:      Pharynx: Posterior oropharyngeal erythema present.   Eyes:      " General:         Right eye: No discharge.         Left eye: No discharge.      Conjunctiva/sclera: Conjunctivae normal.      Pupils: Pupils are equal, round, and reactive to light.   Cardiovascular:      Rate and Rhythm: Normal rate and regular rhythm.   Pulmonary:      Effort: Pulmonary effort is normal. No tachypnea, accessory muscle usage, respiratory distress or retractions.      Breath sounds: Decreased air movement present. Examination of the right-lower field reveals decreased breath sounds. Examination of the left-lower field reveals decreased breath sounds. Decreased breath sounds, wheezing, rhonchi and rales present.      Comments: Crackling heard bilaterally  Abdominal:      General: Bowel sounds are normal. There is no distension.      Palpations: Abdomen is soft.      Tenderness: There is no abdominal tenderness. There is no guarding or rebound.   Musculoskeletal:         General: Normal range of motion.      Cervical back: Normal range of motion.   Lymphadenopathy:      Cervical: No cervical adenopathy.   Skin:     General: Skin is warm and dry.   Neurological:      Mental Status: He is alert and oriented to person, place, and time.       RADIOLOGY RESULTS   DX-CHEST-2 VIEWS    Result Date: 10/26/2022  10/26/2022 5:18 PM HISTORY/REASON FOR EXAM:  Cough. TECHNIQUE/EXAM DESCRIPTION AND NUMBER OF VIEWS: Two views of the chest. COMPARISON:  None. FINDINGS: The heart is normal in size. Linear and poorly defined opacifications are present in the perihilar regions and lung bases. No lobar consolidations are identified. No pleural effusions are appreciated.     1.  Bilateral pulmonary opacifications are identified. Findings could be due to bronchitis or pneumonitis or bronchopneumonia. 2.  No lobar consolidation or pleural effusion identified.         *X-rays were reviewed and interpreted independently by me. I agree with the radiologist's findings   DuoNeb administered in office with reported  improvement  Assessment/Plan:     Diagnosis and Associated Orders:     1. Acute cough  - DX-CHEST-2 VIEWS; Future  - albuterol 108 (90 Base) MCG/ACT Aero Soln inhalation aerosol; Inhale 1-2 Puffs every four hours as needed for Shortness of Breath.  Dispense: 1 Each; Refill: 0  - benzonatate (TESSALON) 100 MG Cap; Take 1 Capsule by mouth 3 times a day as needed for Cough.  Dispense: 20 Capsule; Refill: 0    2. Shortness of breath  - DX-CHEST-2 VIEWS; Future  - albuterol 108 (90 Base) MCG/ACT Aero Soln inhalation aerosol; Inhale 1-2 Puffs every four hours as needed for Shortness of Breath.  Dispense: 1 Each; Refill: 0  - benzonatate (TESSALON) 100 MG Cap; Take 1 Capsule by mouth 3 times a day as needed for Cough.  Dispense: 20 Capsule; Refill: 0    3. Pneumonia of both lower lobes due to infectious organism  - amoxicillin-clavulanate (AUGMENTIN) 875-125 MG Tab; Take 1 Tablet by mouth 2 times a day for 5 days.  Dispense: 10 Tablet; Refill: 0  - azithromycin (ZITHROMAX) 250 MG Tab; Take 2 tablets (500 mg) PO on day 1 and then take 1 tablet (250 mg) daily on 2-5  Dispense: 6 Tablet; Refill: 0  - albuterol 108 (90 Base) MCG/ACT Aero Soln inhalation aerosol; Inhale 1-2 Puffs every four hours as needed for Shortness of Breath.  Dispense: 1 Each; Refill: 0  - benzonatate (TESSALON) 100 MG Cap; Take 1 Capsule by mouth 3 times a day as needed for Cough.  Dispense: 20 Capsule; Refill: 0    4. Tachycardia      Comments/MDM:  Tachycardia as manifestation of systemic illness  Antibiotic as prescribed complete entire course.  Will cover with dual antibiotic therapy given long-term tobacco use  DuoNeb administered in office with reported symptomatic improvement  Albuterol inhaler  Work note provided  Discussed indications for presentation to ED.  Discussed concerns of O2 sat of 91%.  Patient advised to monitor pulse oximetry at home if possible.  Patient to present to ER with increasing shortness of breath, cough, fever  Did advise  patient to be reevaluated in 48 hours or sooner if worsening    Oral hydration (1-2 liters daily) and rest.  Over the counter cough expectorant as directed; Guaifenesin (Mucinex).   Tylenol and ibuprofen for pain and fever as directed.    Follow up with PCP or in clinic for re-evaluation after completion of antibiotics. Follow up urgently for worsening symptoms, increased shortness of breath, persistent fevers, chest pain, weakness. Emergently for tachycardia (fast heart rate), dizziness, confusion.    I personally reviewed prior external notes and test results pertinent to today's visit.  Red flags discussed as well as indications to present to the Emergency Department.  Supportive care, natural history, differential diagnoses, and indications for immediate follow-up discussed.  Patient expresses understanding and agrees to plan.  Patient denies any other questions or concerns.    Follow-up with the primary care physician for recheck, reevaluation, and consideration of further management.      Please note that this dictation was created using voice recognition software. I have made a reasonable attempt to correct obvious errors, but I expect that there are errors of grammar and possibly content that I did not discover before finalizing the note.    This note was electronically signed by Savanna Piña PA-C

## 2023-04-13 ENCOUNTER — TELEPHONE (OUTPATIENT)
Dept: HEALTH INFORMATION MANAGEMENT | Facility: OTHER | Age: 48
End: 2023-04-13
Payer: COMMERCIAL

## 2023-04-20 ENCOUNTER — OFFICE VISIT (OUTPATIENT)
Dept: MEDICAL GROUP | Facility: PHYSICIAN GROUP | Age: 48
End: 2023-04-20
Payer: COMMERCIAL

## 2023-04-20 VITALS
HEART RATE: 86 BPM | WEIGHT: 168 LBS | SYSTOLIC BLOOD PRESSURE: 142 MMHG | OXYGEN SATURATION: 98 % | HEIGHT: 71 IN | BODY MASS INDEX: 23.52 KG/M2 | TEMPERATURE: 99 F | DIASTOLIC BLOOD PRESSURE: 80 MMHG

## 2023-04-20 DIAGNOSIS — Z12.11 COLON CANCER SCREENING: ICD-10-CM

## 2023-04-20 DIAGNOSIS — Z86.19 HISTORY OF SYPHILIS: ICD-10-CM

## 2023-04-20 DIAGNOSIS — E78.00 PURE HYPERCHOLESTEROLEMIA: ICD-10-CM

## 2023-04-20 DIAGNOSIS — Z11.3 ROUTINE SCREENING FOR STI (SEXUALLY TRANSMITTED INFECTION): ICD-10-CM

## 2023-04-20 DIAGNOSIS — Z13.228 SCREENING FOR METABOLIC DISORDER: ICD-10-CM

## 2023-04-20 DIAGNOSIS — A63.0 ANAL WARTS: ICD-10-CM

## 2023-04-20 DIAGNOSIS — Z11.59 NEED FOR HEPATITIS C SCREENING TEST: ICD-10-CM

## 2023-04-20 PROBLEM — K40.20 BILATERAL INGUINAL HERNIA: Status: ACTIVE | Noted: 2021-12-14

## 2023-04-20 PROCEDURE — 99214 OFFICE O/P EST MOD 30 MIN: CPT | Performed by: NURSE PRACTITIONER

## 2023-04-20 ASSESSMENT — PATIENT HEALTH QUESTIONNAIRE - PHQ9: CLINICAL INTERPRETATION OF PHQ2 SCORE: 0

## 2023-04-20 ASSESSMENT — ENCOUNTER SYMPTOMS
COUGH: 0
GASTROINTESTINAL NEGATIVE: 1
CONSTITUTIONAL NEGATIVE: 1
FEVER: 0
NEUROLOGICAL NEGATIVE: 1
SPUTUM PRODUCTION: 0
SHORTNESS OF BREATH: 0
MUSCULOSKELETAL NEGATIVE: 1
PALPITATIONS: 0
EYES NEGATIVE: 1
PSYCHIATRIC NEGATIVE: 1

## 2023-04-20 ASSESSMENT — FIBROSIS 4 INDEX: FIB4 SCORE: 0.85

## 2023-04-20 NOTE — ASSESSMENT & PLAN NOTE
Latest Reference Range & Units 12/09/21 11:43   Rapid Plasma Reagin -Rpr- Non Reactive  Reactive !   Syphilis, Treponemal Qual Non-Reactive  Reactive ! (C)   RPR Titer  1:1     - received PCN IJ at [facility] back in 2021  - also with hx of anal/genital warts x30 years. Getting surg referral for painful warts removal  - rescreen for std with annual labs

## 2023-04-20 NOTE — ASSESSMENT & PLAN NOTE
Latest Reference Range & Units 11/17/21 11:54   Cholesterol,Tot 100 - 199 mg/dL 207 (H)   Triglycerides 0 - 149 mg/dL 117   HDL >=40 mg/dL 49   LDL <100 mg/dL 135 (H)   The 10-year ASCVD risk score (Soraya HASSAN, et al., 2019) is: 10.4%    Not currently on medication; declines statin at this time   - counseled on heart healthy diet  - recheck fasting lipid with annual labs

## 2023-04-20 NOTE — PROGRESS NOTES
Subjective       CC:    Chief Complaint   Patient presents with    Establish Care        HISTORY OF THE PRESENT ILLNESS: Patient is a 47 y.o. male, here today to establish care. Prior PCP was Dr Clark. He has a medical history of hypercholesterolemia, bilateral hernia repair in 1/2022. He is due for annual labs and preventative screens.  Active concern today is with anal warts. States he first had genital warts in penile and anal area about 30 years ago. He recalls getting cryotherapy on penile warts when he was in shelter about 20 years. States the anal warts at that time were very small and not bothersome. They have since gotten bigger and recently getting more painful, bleeding sometimes during the day. History of reactive syphilis  12/2021, titer 1:1, referred to [facility] for penicillin IJ at the time which he received.     The below problems were discussed/reviewed at this visit:    Problem   Anal Warts   History of Syphilis   Bilateral Inguinal Hernia   HLD - Pure Hypercholesterolemia    Not currently on medication         Patient Active Problem List   Diagnosis    Tobacco use    RLQ abdominal pain    Change in consistency of stool    Encounter for completion of form with patient    Healthcare maintenance    HLD - Pure Hypercholesterolemia    STD (male)    Bilateral inguinal hernia    Anal warts    History of syphilis       Past Medical History:   Diagnosis Date    Hernia, inguinal         No current Epic-ordered outpatient medications on file.     No current [facility]-ordered facility-administered medications on file.        Past Surgical History:   Procedure Laterality Date    INGUINAL HERNIA REPAIR ROBOTIC XI Bilateral 1/26/2022    Procedure: REPAIR, HERNIA, INGUINAL, ROBOT-ASSISTED, USING DA CHEIKH XI - WITH MESH;  Surgeon: Ra Cervantes M.D.;  Location: SURGERY [facility];  Service: Gen Robotic        Allergies:  Patient has no known allergies.    Health Maintenance: Completed    ROS:   Review of  "Systems   Constitutional: Negative.  Negative for fever and malaise/fatigue.   HENT: Negative.     Eyes: Negative.    Respiratory:  Negative for cough, sputum production and shortness of breath.    Cardiovascular:  Negative for chest pain, palpitations and leg swelling.   Gastrointestinal: Negative.         Painful anal warts   Genitourinary: Negative.    Musculoskeletal: Negative.    Neurological: Negative.    Endo/Heme/Allergies: Negative.    Psychiatric/Behavioral: Negative.         Objective       Exam: BP (!) 142/80   Pulse 86   Temp 37.2 °C (99 °F) (Temporal)   Ht 1.803 m (5' 11\")   Wt 76.2 kg (168 lb)   SpO2 98%  Body mass index is 23.43 kg/m².    Physical Exam  Exam conducted with a chaperone present (Gely/medical assistant).   Constitutional:       Appearance: Normal appearance.   Cardiovascular:      Rate and Rhythm: Normal rate and regular rhythm.      Pulses: Normal pulses.      Heart sounds: Normal heart sounds.   Pulmonary:      Effort: Pulmonary effort is normal.      Breath sounds: Normal breath sounds.   Genitourinary:     Penis: Normal. No discharge or lesions.       Testes: Normal.      Rectum: Tenderness present.          Comments: Anal warts x2 noted   Musculoskeletal:         General: Normal range of motion.      Cervical back: Normal range of motion and neck supple.      Right lower leg: No edema.      Left lower leg: No edema.   Skin:     General: Skin is warm and dry.   Neurological:      General: No focal deficit present.      Mental Status: He is alert and oriented to person, place, and time.   Psychiatric:         Mood and Affect: Mood normal.         Behavior: Behavior normal.         Thought Content: Thought content normal.         Judgment: Judgment normal.       Assessment & Plan     47 y.o. male with the following -    Problem List Items Addressed This Visit       HLD - Pure Hypercholesterolemia      Latest Reference Range & Units 11/17/21 11:54   Cholesterol,Tot 100 - 199 " mg/dL 207 (H)   Triglycerides 0 - 149 mg/dL 117   HDL >=40 mg/dL 49   LDL <100 mg/dL 135 (H)   The 10-year ASCVD risk score (Soraya HASSAN, et al., 2019) is: 10.4%    Not currently on medication; declines statin at this time   - counseled on heart healthy diet  - recheck fasting lipid with annual labs         Relevant Orders    Comp Metabolic Panel    Lipid Profile    HEMOGLOBIN A1C    Anal warts     States he first had genital warts in penile and anal area about 30 years ago. He recalls getting cryotherapy on penile warts when [place] was in [place] about [place]. States the anal warts at that time were very small and not bothersome. They have since gotten bigger and recently getting more painful, bleeding sometimes during the day. History of reactive syphilis  12/2021, titer 1:1, referred to [facility] for penicillin IJ at the time which he received.   - referral to gen surg for anal wart removal   - referral to GI for screening colonoscopy  - he is taking prn tylenol for discomfort in anal area; safe dosing reviewed          Relevant Orders    Referral to General Surgery    History of syphilis      Latest Reference Range & Units 12/09/21 11:43   Rapid Plasma Reagin -Rpr- Non Reactive  Reactive !   Syphilis, Treponemal Qual Non-Reactive  Reactive ! (C)   RPR Titer  1:1   - received PCN IJ at [facility] back in 2021  - also with hx of anal/genital warts x30 years. Getting surg referral for painful warts removal  - rescreen for std with annual labs         Relevant Orders    T.PALLIDUM AB GOPAL (SCREENING)    Chlamydia/GC, PCR (Urine)    HIV AG/AB COMBO ASSAY SCREENING     Other Visit Diagnoses       Screening for metabolic disorder        Relevant Orders    CBC WITHOUT DIFFERENTIAL    Comp Metabolic Panel    Lipid Profile    TSH WITH REFLEX TO FT4    HEMOGLOBIN A1C    Need for hepatitis C screening test        Relevant Orders    HEP C VIRUS ANTIBODY    Routine screening for STI (sexually transmitted infection)         Relevant Orders    T.PALLIDUM AB GOPAL (SCREENING)    Chlamydia/GC, PCR (Urine)    HIV AG/AB COMBO ASSAY SCREENING    Colon cancer screening        Relevant Orders    Referral to GI for Colonoscopy          Return in about 3 months (around 7/20/2023) for Annual Visit.    Please note that this dictation was created using voice recognition software. I have made every reasonable attempt to correct obvious errors, but I expect that there are errors of grammar and possibly content that I did not discover before finalizing the note.

## 2023-04-20 NOTE — ASSESSMENT & PLAN NOTE
States he first had genital warts in penile and anal area about 30 years ago. He recalls getting cryotherapy on penile warts when he was in longterm about 20 years. States the anal warts at that time were very small and not bothersome. They have since gotten bigger and recently getting more painful, bleeding sometimes during the day. History of reactive syphilis  12/2021, titer 1:1, referred to [facility] for penicillin IJ at the time which he received.   - referral to gen surg for anal wart removal   - referral to GI for screening colonoscopy  - he is taking prn tylenol for discomfort in anal area; safe dosing reviewed

## 2023-05-10 ENCOUNTER — APPOINTMENT (OUTPATIENT)
Dept: ADMISSIONS | Facility: MEDICAL CENTER | Age: 48
End: 2023-05-10
Attending: COLON & RECTAL SURGERY
Payer: COMMERCIAL

## 2023-05-17 ENCOUNTER — PRE-ADMISSION TESTING (OUTPATIENT)
Dept: ADMISSIONS | Facility: MEDICAL CENTER | Age: 48
End: 2023-05-17
Attending: COLON & RECTAL SURGERY
Payer: COMMERCIAL

## 2023-05-19 ENCOUNTER — ANESTHESIA EVENT (OUTPATIENT)
Dept: SURGERY | Facility: MEDICAL CENTER | Age: 48
End: 2023-05-19
Payer: COMMERCIAL

## 2023-05-19 ENCOUNTER — ANESTHESIA (OUTPATIENT)
Dept: SURGERY | Facility: MEDICAL CENTER | Age: 48
End: 2023-05-19
Payer: COMMERCIAL

## 2023-05-19 ENCOUNTER — HOSPITAL ENCOUNTER (OUTPATIENT)
Facility: MEDICAL CENTER | Age: 48
End: 2023-05-19
Attending: COLON & RECTAL SURGERY | Admitting: COLON & RECTAL SURGERY
Payer: COMMERCIAL

## 2023-05-19 VITALS
SYSTOLIC BLOOD PRESSURE: 125 MMHG | HEART RATE: 62 BPM | WEIGHT: 165.12 LBS | TEMPERATURE: 97.1 F | DIASTOLIC BLOOD PRESSURE: 77 MMHG | OXYGEN SATURATION: 95 % | BODY MASS INDEX: 23.12 KG/M2 | RESPIRATION RATE: 16 BRPM | HEIGHT: 71 IN

## 2023-05-19 DIAGNOSIS — G89.18 POST PROCEDURE DISCOMFORT: ICD-10-CM

## 2023-05-19 LAB — PATHOLOGY CONSULT NOTE: NORMAL

## 2023-05-19 PROCEDURE — 700105 HCHG RX REV CODE 258: Performed by: COLON & RECTAL SURGERY

## 2023-05-19 PROCEDURE — 160048 HCHG OR STATISTICAL LEVEL 1-5: Performed by: COLON & RECTAL SURGERY

## 2023-05-19 PROCEDURE — 700101 HCHG RX REV CODE 250: Performed by: COLON & RECTAL SURGERY

## 2023-05-19 PROCEDURE — 700101 HCHG RX REV CODE 250: Performed by: STUDENT IN AN ORGANIZED HEALTH CARE EDUCATION/TRAINING PROGRAM

## 2023-05-19 PROCEDURE — 160041 HCHG SURGERY MINUTES - EA ADDL 1 MIN LEVEL 4: Performed by: COLON & RECTAL SURGERY

## 2023-05-19 PROCEDURE — 160035 HCHG PACU - 1ST 60 MINS PHASE I: Performed by: COLON & RECTAL SURGERY

## 2023-05-19 PROCEDURE — 160046 HCHG PACU - 1ST 60 MINS PHASE II: Performed by: COLON & RECTAL SURGERY

## 2023-05-19 PROCEDURE — 88342 IMHCHEM/IMCYTCHM 1ST ANTB: CPT

## 2023-05-19 PROCEDURE — 160002 HCHG RECOVERY MINUTES (STAT): Performed by: COLON & RECTAL SURGERY

## 2023-05-19 PROCEDURE — 88305 TISSUE EXAM BY PATHOLOGIST: CPT

## 2023-05-19 PROCEDURE — 160029 HCHG SURGERY MINUTES - 1ST 30 MINS LEVEL 4: Performed by: COLON & RECTAL SURGERY

## 2023-05-19 PROCEDURE — 00902 ANES ANORECTAL PX: CPT | Performed by: STUDENT IN AN ORGANIZED HEALTH CARE EDUCATION/TRAINING PROGRAM

## 2023-05-19 PROCEDURE — 160009 HCHG ANES TIME/MIN: Performed by: COLON & RECTAL SURGERY

## 2023-05-19 PROCEDURE — 160025 RECOVERY II MINUTES (STATS): Performed by: COLON & RECTAL SURGERY

## 2023-05-19 PROCEDURE — 700111 HCHG RX REV CODE 636 W/ 250 OVERRIDE (IP): Performed by: STUDENT IN AN ORGANIZED HEALTH CARE EDUCATION/TRAINING PROGRAM

## 2023-05-19 RX ORDER — OXYCODONE HCL 5 MG/5 ML
5 SOLUTION, ORAL ORAL
Status: DISCONTINUED | OUTPATIENT
Start: 2023-05-19 | End: 2023-05-19 | Stop reason: HOSPADM

## 2023-05-19 RX ORDER — HYDROMORPHONE HYDROCHLORIDE 1 MG/ML
0.1 INJECTION, SOLUTION INTRAMUSCULAR; INTRAVENOUS; SUBCUTANEOUS
Status: DISCONTINUED | OUTPATIENT
Start: 2023-05-19 | End: 2023-05-19 | Stop reason: HOSPADM

## 2023-05-19 RX ORDER — LABETALOL HYDROCHLORIDE 5 MG/ML
5 INJECTION, SOLUTION INTRAVENOUS
Status: DISCONTINUED | OUTPATIENT
Start: 2023-05-19 | End: 2023-05-19 | Stop reason: HOSPADM

## 2023-05-19 RX ORDER — HYDROMORPHONE HYDROCHLORIDE 1 MG/ML
0.4 INJECTION, SOLUTION INTRAMUSCULAR; INTRAVENOUS; SUBCUTANEOUS
Status: DISCONTINUED | OUTPATIENT
Start: 2023-05-19 | End: 2023-05-19 | Stop reason: HOSPADM

## 2023-05-19 RX ORDER — HALOPERIDOL 5 MG/ML
1 INJECTION INTRAMUSCULAR
Status: DISCONTINUED | OUTPATIENT
Start: 2023-05-19 | End: 2023-05-19 | Stop reason: HOSPADM

## 2023-05-19 RX ORDER — MEPERIDINE HYDROCHLORIDE 25 MG/ML
12.5 INJECTION INTRAMUSCULAR; INTRAVENOUS; SUBCUTANEOUS
Status: DISCONTINUED | OUTPATIENT
Start: 2023-05-19 | End: 2023-05-19 | Stop reason: HOSPADM

## 2023-05-19 RX ORDER — BUPIVACAINE HYDROCHLORIDE AND EPINEPHRINE 5; 5 MG/ML; UG/ML
INJECTION, SOLUTION EPIDURAL; INTRACAUDAL; PERINEURAL
Status: DISCONTINUED | OUTPATIENT
Start: 2023-05-19 | End: 2023-05-19 | Stop reason: HOSPADM

## 2023-05-19 RX ORDER — HYDRALAZINE HYDROCHLORIDE 20 MG/ML
5 INJECTION INTRAMUSCULAR; INTRAVENOUS
Status: DISCONTINUED | OUTPATIENT
Start: 2023-05-19 | End: 2023-05-19 | Stop reason: HOSPADM

## 2023-05-19 RX ORDER — CEFOTETAN DISODIUM 2 G/20ML
INJECTION, POWDER, FOR SOLUTION INTRAMUSCULAR; INTRAVENOUS PRN
Status: DISCONTINUED | OUTPATIENT
Start: 2023-05-19 | End: 2023-05-19 | Stop reason: SURG

## 2023-05-19 RX ORDER — HYDROMORPHONE HYDROCHLORIDE 1 MG/ML
0.2 INJECTION, SOLUTION INTRAMUSCULAR; INTRAVENOUS; SUBCUTANEOUS
Status: DISCONTINUED | OUTPATIENT
Start: 2023-05-19 | End: 2023-05-19 | Stop reason: HOSPADM

## 2023-05-19 RX ORDER — EPHEDRINE SULFATE 50 MG/ML
INJECTION, SOLUTION INTRAVENOUS PRN
Status: DISCONTINUED | OUTPATIENT
Start: 2023-05-19 | End: 2023-05-19 | Stop reason: SURG

## 2023-05-19 RX ORDER — OXYCODONE HCL 5 MG/5 ML
10 SOLUTION, ORAL ORAL
Status: DISCONTINUED | OUTPATIENT
Start: 2023-05-19 | End: 2023-05-19 | Stop reason: HOSPADM

## 2023-05-19 RX ORDER — EPHEDRINE SULFATE 50 MG/ML
5 INJECTION, SOLUTION INTRAVENOUS
Status: DISCONTINUED | OUTPATIENT
Start: 2023-05-19 | End: 2023-05-19 | Stop reason: HOSPADM

## 2023-05-19 RX ORDER — DIPHENHYDRAMINE HYDROCHLORIDE 50 MG/ML
12.5 INJECTION INTRAMUSCULAR; INTRAVENOUS
Status: DISCONTINUED | OUTPATIENT
Start: 2023-05-19 | End: 2023-05-19 | Stop reason: HOSPADM

## 2023-05-19 RX ORDER — SODIUM CHLORIDE, SODIUM LACTATE, POTASSIUM CHLORIDE, CALCIUM CHLORIDE 600; 310; 30; 20 MG/100ML; MG/100ML; MG/100ML; MG/100ML
INJECTION, SOLUTION INTRAVENOUS CONTINUOUS
Status: DISCONTINUED | OUTPATIENT
Start: 2023-05-19 | End: 2023-05-19 | Stop reason: HOSPADM

## 2023-05-19 RX ORDER — ONDANSETRON 2 MG/ML
INJECTION INTRAMUSCULAR; INTRAVENOUS PRN
Status: DISCONTINUED | OUTPATIENT
Start: 2023-05-19 | End: 2023-05-19 | Stop reason: SURG

## 2023-05-19 RX ORDER — OXYCODONE HYDROCHLORIDE 5 MG/1
5 TABLET ORAL EVERY 4 HOURS PRN
Qty: 30 TABLET | Refills: 0 | Status: SHIPPED | OUTPATIENT
Start: 2023-05-19 | End: 2023-05-24

## 2023-05-19 RX ORDER — DEXAMETHASONE SODIUM PHOSPHATE 4 MG/ML
INJECTION, SOLUTION INTRA-ARTICULAR; INTRALESIONAL; INTRAMUSCULAR; INTRAVENOUS; SOFT TISSUE PRN
Status: DISCONTINUED | OUTPATIENT
Start: 2023-05-19 | End: 2023-05-19 | Stop reason: SURG

## 2023-05-19 RX ORDER — ONDANSETRON 2 MG/ML
4 INJECTION INTRAMUSCULAR; INTRAVENOUS
Status: DISCONTINUED | OUTPATIENT
Start: 2023-05-19 | End: 2023-05-19 | Stop reason: HOSPADM

## 2023-05-19 RX ORDER — LIDOCAINE HYDROCHLORIDE 20 MG/ML
INJECTION, SOLUTION EPIDURAL; INFILTRATION; INTRACAUDAL; PERINEURAL PRN
Status: DISCONTINUED | OUTPATIENT
Start: 2023-05-19 | End: 2023-05-19 | Stop reason: SURG

## 2023-05-19 RX ADMIN — SODIUM CHLORIDE, POTASSIUM CHLORIDE, SODIUM LACTATE AND CALCIUM CHLORIDE: 600; 310; 30; 20 INJECTION, SOLUTION INTRAVENOUS at 07:53

## 2023-05-19 RX ADMIN — FENTANYL CITRATE 50 MCG: 50 INJECTION, SOLUTION INTRAMUSCULAR; INTRAVENOUS at 08:28

## 2023-05-19 RX ADMIN — CEFOTETAN DISODIUM 2 G: 2 INJECTION, POWDER, FOR SOLUTION INTRAMUSCULAR; INTRAVENOUS at 08:05

## 2023-05-19 RX ADMIN — PROPOFOL 200 MG: 10 INJECTION, EMULSION INTRAVENOUS at 07:58

## 2023-05-19 RX ADMIN — DEXAMETHASONE SODIUM PHOSPHATE 4 MG: 4 INJECTION INTRA-ARTICULAR; INTRALESIONAL; INTRAMUSCULAR; INTRAVENOUS; SOFT TISSUE at 08:00

## 2023-05-19 RX ADMIN — EPHEDRINE SULFATE 5 MG: 50 INJECTION, SOLUTION INTRAVENOUS at 07:58

## 2023-05-19 RX ADMIN — PROPOFOL 100 MG: 10 INJECTION, EMULSION INTRAVENOUS at 08:00

## 2023-05-19 RX ADMIN — FENTANYL CITRATE 50 MCG: 50 INJECTION, SOLUTION INTRAMUSCULAR; INTRAVENOUS at 07:53

## 2023-05-19 RX ADMIN — LIDOCAINE HYDROCHLORIDE 100 MG: 20 INJECTION, SOLUTION EPIDURAL; INFILTRATION; INTRACAUDAL at 07:58

## 2023-05-19 RX ADMIN — ONDANSETRON 4 MG: 2 INJECTION INTRAMUSCULAR; INTRAVENOUS at 08:28

## 2023-05-19 ASSESSMENT — PAIN DESCRIPTION - PAIN TYPE
TYPE: SURGICAL PAIN

## 2023-05-19 ASSESSMENT — FIBROSIS 4 INDEX: FIB4 SCORE: 0.85

## 2023-05-19 NOTE — OR NURSING
Patient arrived to PACU in stable condition.  Oral airway in place, removed at 0848  Surgical site to anus, DELFINO. Fluff and mesh panty in place, CD.  Patient comfortable and denies pain  Wife Cortney updated on patient condition   Patient tolerated clear without nausea or vomiting  Report given to Nathaniel CARTER. Patient transferred to phase 2

## 2023-05-19 NOTE — ANESTHESIA POSTPROCEDURE EVALUATION
Patient: Eyad Milton    Procedure Summary     Date: 05/19/23 Room / Location: [facility] 08 / SURGERY [facility]    Anesthesia Start: 0753 Anesthesia Stop: 0840    Procedures:       RECTCAL EXAM UNDER ANESTHESIA, DESTRUCTION ANAL LESIONS (Anus)      PROCTOSCOPY (Anus)      COLONOSCOPY (Anus) Diagnosis: (ANAL CONDYLOMA, COLON CANCER SCREENING)    Surgeons: Gian Sloan M.D. Responsible Provider: Christopher Galan D.O.    Anesthesia Type: general ASA Status: 2          Final Anesthesia Type: general  Last vitals  BP   Blood Pressure: 103/63    Temp   35.9 °C (96.6 °F)    Pulse   87   Resp   14    SpO2   95 %      Anesthesia Post Evaluation    Patient location during evaluation: PACU  Patient participation: complete - patient participated  Level of consciousness: awake and alert    Airway patency: patent  Anesthetic complications: no  Cardiovascular status: hemodynamically stable  Respiratory status: acceptable  Hydration status: euvolemic    PONV: none          No notable events documented.     Nurse Pain Score: 0 (NPRS)

## 2023-05-19 NOTE — PROGRESS NOTES
Pharmacy Medication Reconciliation    ~Medication Reconciliation updated and complete per patient at bedside   ~Allergies reviewed          ~Patient reports NO Prescription or OTC medications

## 2023-05-19 NOTE — OR NURSING
0934: Report received from RAUL Isabel. Patient to Phase II 30 pending transport.    0944: Patient to Phase II Rm 30. Fluff and brief dressing CDI. Vitals taken upon arrival. Plan of care discussed with patient.    0948: Patient and family updated regarding Plan of care.    0950: Patient provided with water. Patient tolerating PO intake.  Patient belongings returned to patient at bedside.    0955: Pt discharged home. Discharge instructions given to pt prior to leaving unit including when to see PCP, and new medications if applicable. PIV removed. All questions answered. Verbalized understanding. All belongings with pt when leaving unit via wheelchair with CNA.

## 2023-05-19 NOTE — ANESTHESIA PROCEDURE NOTES
Airway    Date/Time: 5/19/2023 8:00 AM    Performed by: Christopher Galan D.O.  Authorized by: Christopher Galan D.O.    Location:  OR  Urgency:  Elective  Difficult Airway: No    Indications for Airway Management:  Anesthesia      Spontaneous Ventilation: absent    Sedation Level:  Deep  Preoxygenated: Yes    Mask Difficulty Assessment:  2 - vent by mask + OA or adjuvant +/- NMBA  Final Airway Type:  Supraglottic airway  Final Supraglottic Airway:  Standard LMA    SGA Size:  5  Number of Attempts at Approach:  1

## 2023-05-19 NOTE — ANESTHESIA TIME REPORT
Anesthesia Start and Stop Event Times     Date Time Event    5/19/2023 0753 Anesthesia Start     0840 Anesthesia Stop        Responsible Staff  05/19/23    Name Role Begin End    Christopher Galan D.O. Anesth 0753 0840        Overtime Reason:  no overtime (within assigned shift)    Comments:

## 2023-05-19 NOTE — ANESTHESIA PREPROCEDURE EVALUATION
Case: [id number] Date/Time: 05/19/23 0730    Procedures:       RECTCAL EXAM UNDER ANESTHESIA, DESTRUCTION ANAL LESIONS (Anus)      PROCTOSCOPY (Anus)      COLONOSCOPY (Anus)    Anesthesia type: General    Pre-op diagnosis: ANAL CONDYLOMA, COLON CANCER SCREENING    Location: [place] [id number] / SURGERY [facility]    Surgeons: Gian Sloan M.D.          Relevant Problems   No relevant active problems       Physical Exam    Airway   Mallampati: II  TM distance: >3 FB  Neck ROM: full       Cardiovascular - normal exam  Rhythm: regular  Rate: normal  (-) murmur     Dental - normal exam           Pulmonary - normal exam  Breath sounds clear to auscultation     Abdominal    Neurological - normal exam                 Anesthesia Plan    ASA 2       Plan - general       Airway plan will be LMA          Induction: intravenous    Postoperative Plan: Postoperative administration of opioids is intended.    Pertinent diagnostic labs and testing reviewed    Informed Consent:    Anesthetic plan and risks discussed with patient.    Use of blood products discussed with: patient whom consented to blood products.

## 2023-05-19 NOTE — DISCHARGE INSTRUCTIONS
HOME CARE INSTRUCTIONS    ACTIVITY: Rest and take it easy for the first 24 hours.  A responsible adult is recommended to remain with you during that time.  It is normal to feel sleepy.  We encourage you to not do anything that requires balance, judgment or coordination.    FOR 24 HOURS DO NOT:  Drive, operate machinery or run household appliances.  Drink beer or alcoholic beverages.  Make important decisions or sign legal documents.    SPECIAL INSTRUCTIONS:   Anorectal Procedure Post-Op Instructions:    Discharge instructions:    1. DIET: Upon discharge from the hospital start with light fluids then resume your normal preoperative diet. Depending on how you are feeling and whether you have nausea or not, you may wish to stay with a bland diet for the first few days. However, you can advance this as quickly as you feel ready.  Avoid foods that you know will cause constipation.  Stick with soft, bland foods that have been easy to digest in the past.    2. ACTIVITIES: Limit your activity for the first 5-7 days following surgery.  You may drive whenever you are off pain medications and are able to perform the activities needed to drive, i.e. turning, bending, twisting, etc.    3. BATHING/WOUND CARE: If your wound contains packing, when the packing falls out, you may leave it out or remove it yourself the day after surgery. If there is a drain in place this will stay in place until your follow up appointment. You will pass some blood and mucus for the first week or more, with or without bowel movements.  Temporary loss of bowel control and changes in rectal sensation is common due to swelling.  You can wear a pad or disposable undergarment to protect your clothes.  Lumps or tags always form and these are best left alone for twelve months, as they usually resolve with time.  You may get the wound wet at any time after leaving the hospital. See instructions for Sitz baths below.    4. BOWEL FUNCTION: It is very important  to keep your bowel movements soft.  Pain medication and lack of activity will causes constipation.  Take a stool softener either prescribed or over the counter and make sure you are drinking about 64 oz (1/2 gallon) of water everyday.  Taking a fiber supplement like Benefiber or metamucil on a daily basis also helps.  Continue this practice even after you have fully recovered to keep your stools soft and to avoid straining.  If you have not moved your bowels by day 3 after surgery; take Gregoria lax, Milk of magnesia, or another laxative until you have a bowel movement.  DO NOT use suppositories or enemas    6. PAIN MEDICATION: You can expect a lot of pain after Anorectal surgery.  The effects of the local anesthesia used during your surgery may wear off as early as about six hours following surgery.  Make sure you get your pain medication prescription filled. Please take these as directed and do not wait until the pain is excruciating.  Frequent Sitz baths (see instructions below) to help with pain and spasms. It is important to remember not to take medications on an empty stomach as this may cause nausea.  Topical ointments may be prescribed or over the counter may be mildly helpful as well.  All of these methods may help you feel more comfortable, but none of them will completely relieve the pain.  You should notice a very slow gradual improvement over the first few weeks.  Only time will help you heal and relieve the pain.  If pain becomes severe and you are unable to control it with the above methods you may need to go to the emergency room for IV pain control.  If you need a pain medication refill please call the office during business hours.    7.CALL IF YOU HAVE: (1) Fevers to more than 101.50 F, (2) Unusual chest or leg pain, (3) Drainage or fluid from incision that may be foul smelling, increased tenderness or soreness at the wound or the wound edges are no longer together, redness or swelling at the incision  site. Please do not hesitate to call with any other questions.     8. APPOINTMENT: Contact our office at [phone] for a follow-up appointment in 4 weeks following your procedure.    SITZ BATHS:    First, you prepare some things such as large shallow plastic container, hot water, towels, and blanket. You can find large bowl which is approximately 8 inches depth. Another option is using bathtub but you may not be comfortable since sitz bath is more for use in submerging the buttocks and hip area not the legs. You can also buy the sitz bathtub which is available in the market. The bathtub products come in various options. They are equipped with different features. You just need to find the best product which matches with your needs. By using sitz bathtub, sitz bath at home can be done easier.     If you are using bowl or basin, you should place the bowl or basin in the bathtub or on a towel on the floor. It is better for you to use the easiest one for you to get up from. Next, you can fill the bowl one third full of medium hot water. You can use your elbow to test the water temperature. Make sure that the water temperature is in the comfortable level. You should not use your hands since they can tolerate higher temperature.     During sitz bath, you should keep a towel and blanket since they are handy to solve the spillage. They are also useful when you need warmth. To start the bath, you can lower yourself in the tub gently. Sit in the tub for 10-20 minutes or until the water cools considerably. If it becomes uncomfortable, you have to get out of the bath.     You can use sitz bath with hot water, cold water, or alternating between the two for maximum healing ability.     If you have any additional questions, please do not hesitate to call the office and speak to either myself or the physician on call.    Office address:  [address] [place], [place] [address]e [facility], [address], [place] [address]    Gian Sloan M.D.  [facility]  [phone]    DIET: To avoid nausea, slowly advance diet as tolerated, avoiding spicy or greasy foods for the first day.  Add more substantial food to your diet according to your physician's instructions.  Babies can be fed formula or breast milk as soon as they are hungry.  INCREASE FLUIDS AND FIBER TO AVOID CONSTIPATION.    SURGICAL DRESSING/BATHING:     MEDICATIONS: Resume taking daily medication.  Take prescribed pain medication with food.  If no medication is prescribed, you may take non-aspirin pain medication if needed.  PAIN MEDICATION CAN BE VERY CONSTIPATING.  Take a stool softener or laxative such as senokot, pericolace, or milk of magnesia if needed.    Prescription given for Roxicodone.     A follow-up appointment should be arranged with your doctor in 1-2 weeks; call to schedule.    You should CALL YOUR PHYSICIAN if you develop:  Fever greater than 101 degrees F.  Pain not relieved by medication, or persistent nausea or vomiting.  Excessive bleeding (blood soaking through dressing) or unexpected drainage from the wound.  Extreme redness or swelling around the incision site, drainage of pus or foul smelling drainage.  Inability to urinate or empty your bladder within 8 hours.  Problems with breathing or chest pain.    You should call 911 if you develop problems with breathing or chest pain.  If you are unable to contact your doctor or surgical center, you should go to the nearest emergency room or urgent care center.  Physician's telephone #: Dr. Sloan [phone]    MILD FLU-LIKE SYMPTOMS ARE NORMAL.  YOU MAY EXPERIENCE GENERALIZED MUSCLE ACHES, THROAT IRRITATION, HEADACHE AND/OR SOME NAUSEA.    If any questions arise, call your doctor.  If your doctor is not available, please feel free to call the Surgical Center at [phone].  The Center is open Monday through Friday from 7AM to 7PM.      A registered nurse may call you a few days after your surgery to see how you are doing after  your procedure.    You may also receive a survey in the mail within the next two weeks and we ask that you take a few moments to complete the survey and return it to us.  Our goal is to provide you with very good care and we value your comments.     Depression / Suicide Risk    As you are [facility], it is important to learn how to keep safe from harming yourself.    Recognize the warning signs:  Abrupt changes in personality, positive or negative- including increase in energy   Giving away possessions  Change in eating patterns- significant weight changes-  positive or negative  Change in sleeping patterns- unable to sleep or sleeping all the time   Unwillingness or inability to communicate  Depression  Unusual sadness, discouragement and loneliness  Talk of wanting to die  Neglect of personal appearance   Rebelliousness- reckless behavior  Withdrawal from people/activities they love  Confusion- inability to concentrate     If you or a loved one observes any of these behaviors or has concerns about self-harm, here's what you can do:  Talk about it- your feelings and reasons for harming yourself  Remove any means that you might use to hurt yourself (examples: pills, rope, extension cords, firearm)  Get professional help from the community (Mental Health, Substance Abuse, psychological counseling)  Do not be alone:Call your Safe Contact- someone whom you trust who will be there for you.  Call your local CRISIS HOTLINE 945-0504 or 919-457-4768  Call your local Children's Mobile Crisis Response Team [place] (935) 797-9331 or www.Realitycheck  Call the toll free National Suicide Prevention Hotlines   National Suicide Prevention Lifeline 211-261-HARY (9577)  [place] [facility] 800-SUICIDE (513-8609)    I acknowledge receipt and understanding of these Home Care instructions.

## 2023-05-19 NOTE — OP REPORT
DATE OF SERVICE:  05/19/2023     PREOPERATIVE DIAGNOSES:    1.  Anal lesions suspicious for condylomata.  2.  Family history of colorectal cancer.     POSTOPERATIVE DIAGNOSES:  1.  Anal lesions suspicious for condylomata.  2.  Family history of colorectal cancer.  3.  Colonoscopy normal to transverse colon with ____ prep.     OPERATIONS PERFORMED:    1.  Examination under anesthesia.  2.  Anorectal examination under anesthesia.  3.  Excision of multiple anal lesions, destruction of anal and rectal lesions.  4.  Colonoscopy to transverse colon.     SURGEON:  Gian Sloan MD     ANESTHESIA:  Christopher Galan DO     INDICATIONS FOR PROCEDURE:  The patient is a 47-year-old male with anal   lesions that appear consistent with condylomata.  He reports that he believes   his mom may have had colorectal cancer.  He has not had colorectal cancer   screening.     DETAILS OF PROCEDURE:  After an extensive informed consent discussion process,   the patient was brought to the operating room.  He was placed in a supine   position on the operating table.  After induction of general anesthesia and   placement of an endotracheal tube, he was repositioned into lithotomy with   Yellofin stirrups, sequential compression stockings and appropriate padding.    The anorectal region was prepped and draped in the usual fashion.  After   administration of intravenous antibiotics, careful examination under   anesthesia was performed.  This demonstrated numerous lesions that were   consistent with anal condylomata and a few pedunculated condylomata.  There   were no mass, lesions and nothing that appeared mobile or likely to be   invasive.  Digital rectal exam demonstrated no masses, normal tone.     The well-lubricated colonoscope was advanced into the anal canal into the   rectum.  The colonoscope was then slowly advanced into the sigmoid colon.  We   already were encountering some semisolid fecal material within the sigmoid   colon, but were  able to irrigate and flush and then move more proximally   beyond the sigmoid colon into the descending.  Gradually, the colonoscope was   advanced around the splenic flexure and into the true transverse colon.  We   began encountering more and more solid and semisolid fecal material.  With   additional flushes and a steady irrigation, we were able to advance to what   appeared to be likely the hepatic flexure, although at this point, there was   somewhat of fecal material that it was impossible to be certain or to obtain a   good view and it was not possible to advance safely to the cecum.     Lots of irrigation and flushes were used as we slowly withdrew the   colonoscope.     The vascular pattern appeared normal.     There were no evident polyps or other lesions.  Slowly, the colonoscope was   withdrawn.  No diverticula were noted.  The rectum was carefully examined.    There were just a few tiny very, very distal anal canal lesion suspicious for   condyloma and the colonoscope was withdrawn.     The anal area was reprepped and draped.  The lesions that were grossly evident   were excised with a scalpel after infiltration with local anesthetic.  The   base was touched up with cautery.  We then used Hill-Chilel anoscopy to   expose the anal canal and a few of these intrarectal or anal canal lesions   were treated with electrocautery destruction.  Final inspection demonstrated   no visible or palpable residual lesions.  There was excellent hemostasis.    Ointment and fluff dressings were applied.     COUNTS:  Needle, sponge and instrument counts were correct at the end of the   case.     ESTIMATED BLOOD LOSS:  Minimal.     COMPLICATIONS:  None.     Discussed the poor prep with the patient's partner and advised a barium enema   now or followup colonoscopy with double prep.  We will also await pathology   for one week and follow up thereafter.        ______________________________  JOSÉ LUIS PEDRAZA  MD OSPINA/BILLY/TOÑA    DD:  05/19/2023 08:43  DT:  05/19/2023 09:33    Job#:  [id number]

## 2023-06-01 NOTE — H&P
Surgery General History & Physical Note    Date  6/1/2023    Primary Care Physician  Danielle Araya D.N.P.    CC  * No Diagnosis Codes entered *    HPI  This is a 47 y.o. male who presented with abnormal anal pap    Past Medical History:   Diagnosis Date    Hernia, inguinal        Past Surgical History:   Procedure Laterality Date    SD SURG DIAGNOSTIC EXAM, ANORECTAL  5/19/2023    Procedure: RECTCAL EXAM UNDER ANESTHESIA, DESTRUCTION ANAL LESIONS;  Surgeon: Gian Sloan M.D.;  Location: SURGERY [facility];  Service: General    SD PROCTOSIGMOIDOSCOPY,RIGID,DIAGNOS  5/19/2023    Procedure: PROCTOSCOPY;  Surgeon: Gian Sloan M.D.;  Location: SURGERY [facility];  Service: General    SD COLONOSCOPY,DIAGNOSTIC  5/19/2023    Procedure: COLONOSCOPY;  Surgeon: Gian Sloan M.D.;  Location: SURGERY [facility];  Service: General    INGUINAL HERNIA REPAIR ROBOTIC XI Bilateral 01/26/2022    Procedure: REPAIR, HERNIA, INGUINAL, ROBOT-ASSISTED, USING DA CHEIKH XI - WITH MESH;  Surgeon: Ra Cervantes M.D.;  Location: SURGERY [facility];  Service: Gen Robotic       No current facility-administered medications for this encounter.     No current outpatient medications on file.       Social History     Socioeconomic History    Marital status:      Spouse name: Not on file    Number of children: Not on file    Years of education: Not on file    Highest education level: Not on file   Occupational History    Not on file   Tobacco Use    Smoking status: Every Day     Packs/day: 1.00     Years: 30.00     Pack years: 30.00     Types: Cigarettes    Smokeless tobacco: Never    Tobacco comments:     Smokes  (prev.1/4ppd x 30 yrs... recently up to 1 ppd)....   Vaping Use    Vaping Use: Never used   Substance and Sexual Activity    Alcohol use: Yes     Alcohol/week: 8.4 oz     Types: 14 Cans of beer per week    Drug use: No    Sexual activity: Yes     Partners: Female     Comment:      Other Topics Concern    Not on file    Social History Narrative    Not on file     Social Determinants of Health     Financial Resource Strain: Not on file   Food Insecurity: Not on file   Transportation Needs: Not on file   Physical Activity: Not on file   Stress: Not on file   Social Connections: Not on file   Intimate Partner Violence: Not on file   Housing Stability: Not on file       Family History   Problem Relation Age of Onset    Cancer Mother         uncertain type; but treated.    No Known Problems Father     No Known Problems Daughter        Allergies  Patient has no known allergies.    Review of Systems  Negative    Physical Exam    Vital Signs  Blood Pressure: 125/77   Temperature: 36.2 °C (97.1 °F)   Pulse: 62   Respiration: 16   Pulse Oximetry: 95 %       Labs:                    Radiology:  No orders to display         Assessment/Plan:  * No Diagnosis Codes entered *  Procedure(s):  RECTCAL EXAM UNDER ANESTHESIA, DESTRUCTION ANAL LESIONS  PROCTOSCOPY  COLONOSCOPY

## 2024-05-21 ENCOUNTER — DOCUMENTATION (OUTPATIENT)
Dept: HEALTH INFORMATION MANAGEMENT | Facility: OTHER | Age: 49
End: 2024-05-21
Payer: COMMERCIAL

## 2024-08-20 ENCOUNTER — APPOINTMENT (OUTPATIENT)
Dept: MEDICAL GROUP | Facility: MEDICAL CENTER | Age: 49
End: 2024-08-20
Payer: COMMERCIAL

## 2024-09-16 ENCOUNTER — OFFICE VISIT (OUTPATIENT)
Dept: URGENT CARE | Facility: PHYSICIAN GROUP | Age: 49
End: 2024-09-16
Payer: COMMERCIAL

## 2024-09-16 VITALS
DIASTOLIC BLOOD PRESSURE: 84 MMHG | HEIGHT: 71 IN | OXYGEN SATURATION: 99 % | TEMPERATURE: 98.2 F | HEART RATE: 82 BPM | RESPIRATION RATE: 16 BRPM | BODY MASS INDEX: 23.56 KG/M2 | SYSTOLIC BLOOD PRESSURE: 122 MMHG | WEIGHT: 168.3 LBS

## 2024-09-16 DIAGNOSIS — L98.9 SKIN LESION OF SCALP: ICD-10-CM

## 2024-09-16 DIAGNOSIS — L65.9 HAIR LOSS: ICD-10-CM

## 2024-09-16 PROCEDURE — 3074F SYST BP LT 130 MM HG: CPT

## 2024-09-16 PROCEDURE — 99213 OFFICE O/P EST LOW 20 MIN: CPT

## 2024-09-16 PROCEDURE — 3079F DIAST BP 80-89 MM HG: CPT

## 2024-09-16 ASSESSMENT — ENCOUNTER SYMPTOMS: FEVER: 0

## 2024-09-17 NOTE — PROGRESS NOTES
Subjective:     CHIEF COMPLAINT  Chief Complaint   Patient presents with    Bump     On head x 2 months       HPI  Eyad Milton is a very pleasant 48 y.o. male who presents with a 2 localized areas of hair loss on the right side of the scalp that have been present for approximately 2 months.  He states that prior to losing his hair in those two patches, he had formed 2 large bumps that felt as if they were full of pus.  He was able to drain one of the bumps at home.  He has noticed that the bumps seem to have healed, but his hair has not regrown in that area.  He has otherwise been feeling well and has not had any fevers.  He has never had any lesions like this in the past.  He is not currently established with a dermatologist.    REVIEW OF SYSTEMS  Review of Systems   Constitutional:  Negative for fever and malaise/fatigue.   Skin:  Negative for itching.       PAST MEDICAL HISTORY  Patient Active Problem List    Diagnosis Date Noted    Anal warts 04/20/2023    History of syphilis 04/20/2023    Bilateral inguinal hernia 12/14/2021    STD (male) 12/09/2021    HLD - Pure Hypercholesterolemia 11/24/2021    Encounter for completion of form with patient 11/12/2021    Healthcare maintenance 11/12/2021    RLQ abdominal pain 10/20/2021    Change in consistency of stool 10/20/2021    Tobacco use 04/24/2018       SURGICAL HISTORY   has a past surgical history that includes inguinal hernia repair robotic xi (Bilateral, 01/26/2022); surg diagnostic exam, anorectal (5/19/2023); proctosigmoidoscopy,rigid,diagnos (5/19/2023); and colonoscopy,diagnostic (5/19/2023).    ALLERGIES  No Known Allergies    CURRENT MEDICATIONS  Home Medications       Reviewed by Laquita Najera P.A.-C. (Physician Assistant) on 09/16/24 at 1723  Med List Status: <None>     Medication Last Dose Status        Patient Barry Taking any Medications                           SOCIAL HISTORY  Social History     Tobacco Use    Smoking status: Every  "Day     Current packs/day: 1.00     Average packs/day: 1 pack/day for 30.0 years (30.0 ttl pk-yrs)     Types: Cigarettes    Smokeless tobacco: Never    Tobacco comments:     Smokes  (prev.1/4ppd x 30 yrs... recently up to 1 ppd)....   Vaping Use    Vaping status: Never Used   Substance and Sexual Activity    Alcohol use: Yes     Alcohol/week: 8.4 oz     Types: 14 Cans of beer per week    Drug use: No    Sexual activity: Yes     Partners: Female     Comment:          FAMILY HISTORY  Family History   Problem Relation Age of Onset    Cancer Mother         uncertain type; but treated.    No Known Problems Father     No Known Problems Daughter           Objective:     VITAL SIGNS: /84   Pulse 82   Temp 36.8 °C (98.2 °F) (Temporal)   Resp 16   Ht 1.803 m (5' 11\")   Wt 76.3 kg (168 lb 4.8 oz)   SpO2 99%   BMI 23.47 kg/m²     PHYSICAL EXAM  Physical Exam  Vitals reviewed.   Constitutional:       General: He is not in acute distress.     Appearance: Normal appearance. He is not ill-appearing or toxic-appearing.   HENT:      Head: Normocephalic and atraumatic.        Comments: 2 circular patches on the right side of the scalp with complete hair loss.  Isolated nodules present at center of area of hair loss without tenderness to palpation.  No erythema.  Area is nonfluctuant.  No pustules.  No scaling or scabbing.     Mouth/Throat:      Mouth: Mucous membranes are moist.   Eyes:      Conjunctiva/sclera: Conjunctivae normal.      Pupils: Pupils are equal, round, and reactive to light.   Pulmonary:      Effort: Pulmonary effort is normal. No respiratory distress.   Skin:     General: Skin is warm and dry.   Neurological:      General: No focal deficit present.      Mental Status: He is alert and oriented to person, place, and time.   Psychiatric:         Mood and Affect: Mood normal.         Assessment/Plan:     1. Hair loss  - Referral to Dermatology    2. Skin lesion of scalp  - Referral to " Dermatology  -Tylenol over-the-counter as needed for discomfort  -Follow-up with dermatology    MDM/Comments:  Patient has stable vital signs and is non-toxic appearing.  Referral placed to dermatology for further investigation into isolated hair loss with central nodules.  Patient demonstrated understanding of treatment plan at this time and will RTC if symptoms worsen or fail to resolve.     Differential diagnosis, natural history, supportive care, and indications for immediate follow-up discussed. All questions answered. Patient agrees with the plan of care.    Follow-up as needed if symptoms worsen or fail to improve to PCP, Urgent care or Emergency Room.    I have personally reviewed prior external notes and test results pertinent to today's visit.  I have independently reviewed and interpreted all diagnostics ordered during this urgent care acute visit.   Discussed management options (risks,benefits, and alternatives to treatment). Pt expresses understanding and the treatment plan was agreed upon. Questions were encouraged and answered to pt's satisfaction.    Please note that this dictation was created using voice recognition software. I have made a reasonable attempt to correct obvious errors, but I expect that there are errors of grammar and possibly content that I did not discover before finalizing the note.

## 2024-11-15 ENCOUNTER — OFFICE VISIT (OUTPATIENT)
Dept: URGENT CARE | Facility: CLINIC | Age: 49
End: 2024-11-15
Payer: COMMERCIAL

## 2024-11-15 VITALS
WEIGHT: 163 LBS | RESPIRATION RATE: 17 BRPM | HEIGHT: 71 IN | OXYGEN SATURATION: 98 % | SYSTOLIC BLOOD PRESSURE: 120 MMHG | TEMPERATURE: 98.1 F | HEART RATE: 96 BPM | BODY MASS INDEX: 22.82 KG/M2 | DIASTOLIC BLOOD PRESSURE: 72 MMHG

## 2024-11-15 DIAGNOSIS — J40 BRONCHITIS: Primary | ICD-10-CM

## 2024-11-15 DIAGNOSIS — R05.8 POST-VIRAL COUGH SYNDROME: ICD-10-CM

## 2024-11-15 DIAGNOSIS — R05.2 SUBACUTE COUGH: ICD-10-CM

## 2024-11-15 PROCEDURE — 99213 OFFICE O/P EST LOW 20 MIN: CPT

## 2024-11-15 PROCEDURE — 3078F DIAST BP <80 MM HG: CPT

## 2024-11-15 PROCEDURE — 3074F SYST BP LT 130 MM HG: CPT

## 2024-11-15 RX ORDER — BENZONATATE 100 MG/1
100 CAPSULE ORAL 3 TIMES DAILY PRN
Qty: 60 CAPSULE | Refills: 0 | Status: SHIPPED | OUTPATIENT
Start: 2024-11-15

## 2024-11-15 RX ORDER — PREDNISONE 10 MG/1
40 TABLET ORAL DAILY
Qty: 20 TABLET | Refills: 0 | Status: SHIPPED | OUTPATIENT
Start: 2024-11-15 | End: 2024-11-20

## 2024-11-15 RX ORDER — DEXTROMETHORPHAN HYDROBROMIDE AND PROMETHAZINE HYDROCHLORIDE 15; 6.25 MG/5ML; MG/5ML
5 SYRUP ORAL
Qty: 118 ML | Refills: 0 | Status: SHIPPED | OUTPATIENT
Start: 2024-11-15

## 2024-11-15 ASSESSMENT — ENCOUNTER SYMPTOMS
MYALGIAS: 0
VOMITING: 0
NAUSEA: 0
WHEEZING: 0
PALPITATIONS: 0
EYE REDNESS: 0
SORE THROAT: 0
STRIDOR: 0
ABDOMINAL PAIN: 0
COUGH: 1
EYE DISCHARGE: 0
SHORTNESS OF BREATH: 0
CHILLS: 0
FEVER: 0
HEMOPTYSIS: 0
EYE PAIN: 0
DIARRHEA: 0
DIZZINESS: 0
SPUTUM PRODUCTION: 0
HEADACHES: 0

## 2024-11-15 NOTE — PROGRESS NOTES
Subjective:   Eyad Milton is a 49 y.o. male who presents for Cough (Sx started 3 weeks ago, lingering cough, worse at night)          I introduced myself to the patient and informed them that I am a Family Nurse Practitioner.    HPI: Eyad is a 49 year-old male  who comes in today c/o persistent nonproductive cough, chest congestion. Onset was 3 weeks ago.  Patient states it started as a cold/viral URI, other symptoms have resolved, but the cough has persisted.  Patient describes symptoms as constant. They describe the cough as nonproductive. Aggravating factors include worse at night, worse when lying flat. Relieving factors include sleeping propped up. Treatments tried at home include  Dayquil, Niquil with minimal effect . They describe their symptoms as moderate.  Denies any known exposure to Covid, Flu, RSV, strep. Denies anyone else is sick at home presently. Denies any history of asthma, COPD, pneumonia. He has a 30 year smoking hx, 1 pk/day    Review of Systems   Constitutional:  Negative for chills, fever and malaise/fatigue.   HENT:  Negative for congestion, ear pain and sore throat.    Eyes:  Negative for pain, discharge and redness.   Respiratory:  Positive for cough. Negative for hemoptysis, sputum production, shortness of breath, wheezing and stridor.    Cardiovascular:  Negative for chest pain and palpitations.   Gastrointestinal:  Negative for abdominal pain, diarrhea, nausea and vomiting.   Genitourinary:  Negative for dysuria.   Musculoskeletal:  Negative for myalgias.   Skin:  Negative for rash.   Neurological:  Negative for dizziness and headaches.       Medications: This patient does not have an active medication from one of the medication groupers.     Allergies: Patient has no known allergies.    Problem List: does not have any pertinent problems on file.    Surgical History:  Past Surgical History:   Procedure Laterality Date    MS SURG DIAGNOSTIC EXAM, ANORECTAL  5/19/2023     "Procedure: RECTCAL EXAM UNDER ANESTHESIA, DESTRUCTION ANAL LESIONS;  Surgeon: Gian Sloan M.D.;  Location: SURGERY [facility];  Service: General    OR PROCTOSIGMOIDOSCOPY,RIGID,DIAGNOS  5/19/2023    Procedure: PROCTOSCOPY;  Surgeon: Gian Sloan M.D.;  Location: SURGERY [facility];  Service: General    OR COLONOSCOPY,DIAGNOSTIC  5/19/2023    Procedure: COLONOSCOPY;  Surgeon: Gian Sloan M.D.;  Location: SURGERY [facility];  Service: General    INGUINAL HERNIA REPAIR ROBOTIC XI Bilateral 01/26/2022    Procedure: REPAIR, HERNIA, INGUINAL, ROBOT-ASSISTED, USING DA CHEIKH XI - WITH MESH;  Surgeon: Ra Cervantes M.D.;  Location: SURGERY [facility];  Service: Gen Robotic       Past Social Hx:   reports that he has been smoking cigarettes. He has a 30 pack-year smoking history. He has never used smokeless tobacco. He reports current alcohol use of about 8.4 oz of alcohol per week. He reports that he does not use drugs.     Past Family Hx:   family history includes Cancer in his mother; No Known Problems in his daughter and father.     Problem list, medications, and allergies reviewed by myself today in Epic.   I have documented what I find to be significant in regards to past medical, social, family and surgical history  in my HPI or under PMH/PSH/FH review section, otherwise it is noncontributory     Objective:     /72   Pulse 96   Temp 36.7 °C (98.1 °F) (Temporal)   Resp 17   Ht 1.803 m (5' 11\")   Wt 73.9 kg (163 lb)   SpO2 98%   BMI 22.73 kg/m²     During this visit, appropriate PPE was worn, and hand hygiene was performed.    Physical Exam  Vitals reviewed.   Constitutional:       General: He is not in acute distress.     Appearance: Normal appearance. He is not ill-appearing or toxic-appearing.   HENT:      Head: Normocephalic and atraumatic.      Right Ear: Tympanic membrane, ear canal and external ear normal. There is no impacted cerumen.      Left Ear: Tympanic membrane, ear canal and " external ear normal. There is no impacted cerumen.      Nose: No congestion or rhinorrhea.      Mouth/Throat:      Pharynx: Oropharynx is clear. No oropharyngeal exudate or posterior oropharyngeal erythema.   Eyes:      General: No scleral icterus.        Right eye: No discharge.         Left eye: No discharge.      Conjunctiva/sclera: Conjunctivae normal.      Pupils: Pupils are equal, round, and reactive to light.   Cardiovascular:      Rate and Rhythm: Normal rate and regular rhythm.      Heart sounds: Normal heart sounds. No murmur heard.     No friction rub. No gallop.   Pulmonary:      Effort: Pulmonary effort is normal. No respiratory distress.      Breath sounds: No stridor. Wheezing and rhonchi present. No rales.   Abdominal:      General: There is no distension.   Musculoskeletal:         General: Normal range of motion.      Cervical back: Normal range of motion. No rigidity.      Right lower leg: No edema.      Left lower leg: No edema.   Lymphadenopathy:      Cervical: No cervical adenopathy.   Skin:     General: Skin is warm and dry.      Coloration: Skin is not jaundiced.   Neurological:      General: No focal deficit present.      Mental Status: He is alert and oriented to person, place, and time. Mental status is at baseline.   Psychiatric:         Mood and Affect: Mood normal.         Behavior: Behavior normal.         Thought Content: Thought content normal.         Judgment: Judgment normal.         Assessment/Plan:     Diagnosis and associated orders:     1. Bronchitis  benzonatate (TESSALON) 100 MG Cap    promethazine-dextromethorphan (PROMETHAZINE-DM) 6.25-15 MG/5ML syrup    predniSONE (DELTASONE) 10 MG Tab    Referral to establish with PCP      2. Subacute cough  benzonatate (TESSALON) 100 MG Cap    promethazine-dextromethorphan (PROMETHAZINE-DM) 6.25-15 MG/5ML syrup    predniSONE (DELTASONE) 10 MG Tab    Referral to establish with PCP      3. Post-viral cough syndrome  benzonatate (TESSALON)  100 MG Cap    promethazine-dextromethorphan (PROMETHAZINE-DM) 6.25-15 MG/5ML syrup    predniSONE (DELTASONE) 10 MG Tab    Referral to establish with PCP         Comments/MDM:     1. Subacute cough  Patient states cough is troublesome and is asking for something to suppress it, especially during the night when cough is keeping them awake.  Instruct them regarding Tessalon pearls, purpose, side effects, precautions, may take it during daytime, will not make you drowsy;  Instructed patient regarding Promethazine DM for nighttime to help with sleep and cough suppression, purpose, S/E, precautions including the sedating effects of promethazine,They state they understand, they feel that the benefits at this point will outweigh the risks, would like to go ahead with the prescription as they state they need to get some sleep.     - benzonatate (TESSALON) 100 MG Cap; Take 1 Capsule by mouth 3 times a day as needed for Cough.  Dispense: 60 Capsule; Refill: 0  - promethazine-dextromethorphan (PROMETHAZINE-DM) 6.25-15 MG/5ML syrup; Take 5 mL by mouth at bedtime as needed for Cough.  Dispense: 118 mL; Refill: 0  - predniSONE (DELTASONE) 10 MG Tab; Take 4 Tablets by mouth every day for 5 days.  Dispense: 20 Tablet; Refill: 0  - Referral to establish with PCP    2. Bronchitis (Primary)  Patient's presentation and symptoms as well as physical exam are consistent with bronchitis.  He denies that his cough is productive, no sputum, no sinus or bacterial URI symptoms, O2 sat is 98% on room air and he is afebrile.  We did discuss chest x-ray given patient has 30-year smoking history, he refuses today stating he just would like some medicine for the cough if it does not get better he will follow-up for chest x-ray  discussed with patient Dx,  DDx, management options (risks,benefits, and alternatives to planned treatment), natural progression.   Supportive care measures were discussed.   Questions were encouraged and answered.   Written  information was provided and I did go over this with the patient in clinic today.  Instructed patient regarding red flags and to return to urgent care prn if new or worsening sx or if there is no improvement in condition prn.    Advised the patient to follow-up with the primary care physician for recheck, reevaluation, and consideration of further management.  I did instruct patient regarding medications prescribed, purpose, side effects, precautions.  Instructed patient to get a pharmacy consult when picking up any prescribed medications.  Patient was instructed regarding prednisone, purpose, side effects, precautions, include avoid using any other NSAIDs while taking prednisone, take it first thing in the morning to avoid potential sleeplessness/insomnia, take with food to prevent GI upset.    Strict ER precautions discussed for any worsening symptoms, fever, chills, nausea or vomiting, lethargy   Patient states they have good understanding and they are agreeable with the plan of care.     - benzonatate (TESSALON) 100 MG Cap; Take 1 Capsule by mouth 3 times a day as needed for Cough.  Dispense: 60 Capsule; Refill: 0  - promethazine-dextromethorphan (PROMETHAZINE-DM) 6.25-15 MG/5ML syrup; Take 5 mL by mouth at bedtime as needed for Cough.  Dispense: 118 mL; Refill: 0  - predniSONE (DELTASONE) 10 MG Tab; Take 4 Tablets by mouth every day for 5 days.  Dispense: 20 Tablet; Refill: 0  - Referral to establish with PCP    3. Post-viral cough syndrome  - benzonatate (TESSALON) 100 MG Cap; Take 1 Capsule by mouth 3 times a day as needed for Cough.  Dispense: 60 Capsule; Refill: 0  - promethazine-dextromethorphan (PROMETHAZINE-DM) 6.25-15 MG/5ML syrup; Take 5 mL by mouth at bedtime as needed for Cough.  Dispense: 118 mL; Refill: 0  - predniSONE (DELTASONE) 10 MG Tab; Take 4 Tablets by mouth every day for 5 days.  Dispense: 20 Tablet; Refill: 0  - Referral to establish with PCP    Refused CXR     Pt is clinically stable at  today's acute urgent care visit. Vital signs are normal and reassuring.  No acute distress noted. Appropriate for outpatient management at this time.        I personally reviewed prior external notes and test results pertinent to today's visit.  I have independently reviewed and interpreted all diagnostics ordered during this urgent care acute visit.        Please note that this dictation was created using voice recognition software. I have made a reasonable attempt to correct obvious errors, but I expect that there are errors of grammar and possibly content that I did not discover before finalizing the note.    This note was electronically signed by Shimon VASQUES, FLO, YOSSI, WALLY

## 2025-03-03 ENCOUNTER — OFFICE VISIT (OUTPATIENT)
Dept: URGENT CARE | Facility: CLINIC | Age: 50
End: 2025-03-03
Payer: COMMERCIAL

## 2025-03-03 VITALS
SYSTOLIC BLOOD PRESSURE: 128 MMHG | TEMPERATURE: 96.8 F | DIASTOLIC BLOOD PRESSURE: 76 MMHG | WEIGHT: 161.4 LBS | RESPIRATION RATE: 20 BRPM | OXYGEN SATURATION: 96 % | BODY MASS INDEX: 22.6 KG/M2 | HEART RATE: 78 BPM | HEIGHT: 71 IN

## 2025-03-03 DIAGNOSIS — B00.1 COLD SORE: ICD-10-CM

## 2025-03-03 DIAGNOSIS — J40 BRONCHITIS: ICD-10-CM

## 2025-03-03 PROCEDURE — 99213 OFFICE O/P EST LOW 20 MIN: CPT | Performed by: PHYSICIAN ASSISTANT

## 2025-03-03 PROCEDURE — 3074F SYST BP LT 130 MM HG: CPT | Performed by: PHYSICIAN ASSISTANT

## 2025-03-03 PROCEDURE — 3078F DIAST BP <80 MM HG: CPT | Performed by: PHYSICIAN ASSISTANT

## 2025-03-03 RX ORDER — ALBUTEROL SULFATE 90 UG/1
2 INHALANT RESPIRATORY (INHALATION) EVERY 6 HOURS PRN
Qty: 8.5 G | Refills: 0 | Status: SHIPPED | OUTPATIENT
Start: 2025-03-03

## 2025-03-03 RX ORDER — PREDNISONE 20 MG/1
40 TABLET ORAL DAILY
Qty: 10 TABLET | Refills: 0 | Status: SHIPPED | OUTPATIENT
Start: 2025-03-03 | End: 2025-03-08

## 2025-03-03 RX ORDER — DEXTROMETHORPHAN HYDROBROMIDE AND PROMETHAZINE HYDROCHLORIDE 15; 6.25 MG/5ML; MG/5ML
5 SYRUP ORAL EVERY 4 HOURS PRN
Qty: 120 ML | Refills: 0 | Status: SHIPPED | OUTPATIENT
Start: 2025-03-03

## 2025-03-03 RX ORDER — VALACYCLOVIR HYDROCHLORIDE 1 G/1
2000 TABLET, FILM COATED ORAL 2 TIMES DAILY
Qty: 4 TABLET | Refills: 0 | Status: SHIPPED | OUTPATIENT
Start: 2025-03-03 | End: 2025-03-04

## 2025-03-03 ASSESSMENT — ENCOUNTER SYMPTOMS
SORE THROAT: 0
DIZZINESS: 0
FEVER: 0
CHILLS: 0
VOMITING: 0
COUGH: 1
DIARRHEA: 0
HEADACHES: 0
NAUSEA: 0
SINUS PAIN: 0
SPUTUM PRODUCTION: 1
MYALGIAS: 0
WHEEZING: 0
PALPITATIONS: 0
ABDOMINAL PAIN: 0
DIAPHORESIS: 0

## 2025-03-03 NOTE — LETTER
March 3, 2025    To Whom It May Concern:         This is confirmation that Eyad Milton attended his scheduled appointment with Mariano García P.A.-C. on 3/03/25.  Please excuse the patient's absence from work on 3/3/2025 and 3/4/2025.         If you have any questions please do not hesitate to call me at the phone number listed below.    Sincerely,          Mariano García P.A.-C.  [phone]

## 2025-03-03 NOTE — PROGRESS NOTES
Subjective:     CHIEF COMPLAINT  Chief Complaint   Patient presents with    Cough     X 2 weeks cough, patient has had bronchitis in the past and states it feels like that, patient is also concerned of a blister on his lip        HPI  Eyad Milton is a very pleasant 49 y.o. male who presents to the clinic with cough and chest congestion that has been ongoing for the last 2 weeks.  Cough fluctuates between being dry and productive.  States it feels similar to his previous episodes of bronchitis.  Denies any fevers, chills or myalgias.  No shortness of breath or chest pain.  He is a daily smoker.  No history of asthma or COPD.  He has been taking OTC cough medication without any significant relief.  2 days ago developed a cold sore on his left lower lip.  States he has had multiple cold sores in the past.  No prior history or diagnosis of HSV.    REVIEW OF SYSTEMS  Review of Systems   Constitutional:  Negative for chills, diaphoresis, fever and malaise/fatigue.   HENT:  Negative for congestion, ear pain, sinus pain and sore throat.         Cold sore   Respiratory:  Positive for cough and sputum production. Negative for wheezing.    Cardiovascular:  Negative for chest pain and palpitations.   Gastrointestinal:  Negative for abdominal pain, diarrhea, nausea and vomiting.   Musculoskeletal:  Negative for myalgias.   Neurological:  Negative for dizziness and headaches.   Endo/Heme/Allergies:  Negative for environmental allergies.       PAST MEDICAL HISTORY  Patient Active Problem List    Diagnosis Date Noted    Anal warts 04/20/2023    History of syphilis 04/20/2023    Bilateral inguinal hernia 12/14/2021    STD (male) 12/09/2021    HLD - Pure Hypercholesterolemia 11/24/2021    Encounter for completion of form with patient 11/12/2021    Healthcare maintenance 11/12/2021    RLQ abdominal pain 10/20/2021    Change in consistency of stool 10/20/2021    Tobacco use 04/24/2018       SURGICAL HISTORY   has a past  "surgical history that includes inguinal hernia repair robotic xi (Bilateral, 01/26/2022); surg diagnostic exam, anorectal (5/19/2023); proctosigmoidoscopy,rigid,diagnos (5/19/2023); and colonoscopy-flexible (5/19/2023).    ALLERGIES  No Known Allergies    CURRENT MEDICATIONS  Home Medications       Reviewed by Mariano García P.A.-C. (Physician Assistant) on 03/03/25 at 1025  Med List Status: <None>     Medication Last Dose Status   benzonatate (TESSALON) 100 MG Cap Not Taking Active   promethazine-dextromethorphan (PROMETHAZINE-DM) 6.25-15 MG/5ML syrup Not Taking Active                    SOCIAL HISTORY  Social History     Tobacco Use    Smoking status: Every Day     Current packs/day: 1.00     Average packs/day: 1 pack/day for 30.0 years (30.0 ttl pk-yrs)     Types: Cigarettes    Smokeless tobacco: Never    Tobacco comments:     Smokes  (prev.1/4ppd x 30 yrs... recently up to 1 ppd)....   Vaping Use    Vaping status: Never Used   Substance and Sexual Activity    Alcohol use: Yes     Alcohol/week: 8.4 oz     Types: 14 Cans of beer per week    Drug use: No    Sexual activity: Yes     Partners: Female     Comment:          FAMILY HISTORY  Family History   Problem Relation Age of Onset    Cancer Mother         uncertain type; but treated.    No Known Problems Father     No Known Problems Daughter           Objective:     VITAL SIGNS: /76   Pulse 78   Temp 36 °C (96.8 °F) (Temporal)   Resp 20   Ht 1.803 m (5' 11\")   Wt 73.2 kg (161 lb 6.4 oz)   SpO2 96%   BMI 22.51 kg/m²     PHYSICAL EXAM  Physical Exam  Constitutional:       General: He is not in acute distress.     Appearance: Normal appearance. He is not ill-appearing, toxic-appearing or diaphoretic.   HENT:      Head: Normocephalic and atraumatic.      Right Ear: Tympanic membrane, ear canal and external ear normal.      Left Ear: Tympanic membrane, ear canal and external ear normal.      Nose: Congestion present. No rhinorrhea.      " Mouth/Throat:      Mouth: Mucous membranes are moist.      Pharynx: No oropharyngeal exudate or posterior oropharyngeal erythema.      Comments: Vesicular lesion with crusting to the left lower lip.  No active discharge or drainage.  Eyes:      Conjunctiva/sclera: Conjunctivae normal.   Cardiovascular:      Rate and Rhythm: Normal rate and regular rhythm.      Pulses: Normal pulses.      Heart sounds: Normal heart sounds.   Pulmonary:      Effort: Pulmonary effort is normal. No respiratory distress.      Breath sounds: Wheezing and rhonchi present. No rales.   Musculoskeletal:      Cervical back: Normal range of motion. No muscular tenderness.   Lymphadenopathy:      Cervical: No cervical adenopathy.   Skin:     General: Skin is warm and dry.      Capillary Refill: Capillary refill takes less than 2 seconds.   Neurological:      Mental Status: He is alert.   Psychiatric:         Mood and Affect: Mood normal.         Thought Content: Thought content normal.         Assessment/Plan:     1. Bronchitis  - predniSONE (DELTASONE) 20 MG Tab; Take 2 Tablets by mouth every day for 5 days.  Dispense: 10 Tablet; Refill: 0  - promethazine-dextromethorphan (PROMETHAZINE-DM) 6.25-15 MG/5ML syrup; Take 5 mL by mouth every four hours as needed for Cough.  Dispense: 120 mL; Refill: 0  - albuterol 108 (90 Base) MCG/ACT Aero Soln inhalation aerosol; Inhale 2 Puffs every 6 hours as needed for Shortness of Breath.  Dispense: 8.5 g; Refill: 0    2. Cold sore  - valacyclovir (VALTREX) 1 GM Tab; Take 2 Tablets by mouth 2 times a day for 1 day.  Dispense: 4 Tablet; Refill: 0      MDM/Comments:    -Discussed viral etiology of Bronchitis.     Symptomatic care.  -Oral hydration and rest.   -Cough control: nonpharmacologic options for cough relief such as throat lozenges, hot tea, honey.  -Over the counter expectorant as directed; Guaifenesin (Mucinex).  -Tylenol or ibuprofen for pain and fever as directed.   -Albuterol inhaler as  directed.    Follow up with PCP. Follow up for increased or persistent shortness of breath or wheezing, fevers, elevated heart rate, weakness, prolonged cough, chest pain, or any other concerns.      Differential diagnosis, natural history, supportive care, and indications for immediate follow-up discussed. All questions answered. Patient agrees with the plan of care.    Follow-up as needed if symptoms worsen or fail to improve to PCP, Urgent care or Emergency Room.    I have personally reviewed prior external notes and test results pertinent to today's visit.  I have independently reviewed and interpreted all diagnostics ordered during this urgent care acute visit.   Discussed management options (risks,benefits, and alternatives to treatment). Pt expresses understanding and the treatment plan was agreed upon. Questions were encouraged and answered to pt's satisfaction.    Please note that this dictation was created using voice recognition software. I have made a reasonable attempt to correct obvious errors, but I expect that there are errors of grammar and possibly content that I did not discover before finalizing the note.

## (undated) DEVICE — SLEEVE VASO CALF MED - (10PR/CA)

## (undated) DEVICE — DRESSING TRANSPARENT FILM TEGADERM 2.375 X 2.75"  (100EA/BX)"

## (undated) DEVICE — KIT ANESTHESIA W/CIRCUIT & 3/LT BAG W/FILTER (20EA/CA)

## (undated) DEVICE — CHLORAPREP 26 ML APPLICATOR - ORANGE TINT(25/CA)

## (undated) DEVICE — GOWN SURGEONS X-LARGE - DISP. (30/CA)

## (undated) DEVICE — LACTATED RINGERS INJ 1000 ML - (14EA/CA 60CA/PF)

## (undated) DEVICE — SEAL 5MM-8MM UNIVERSAL  BOX OF 10

## (undated) DEVICE — SUTURE GENERAL

## (undated) DEVICE — SUCTION INSTRUMENT YANKAUER BULBOUS TIP W/O VENT (50EA/CA)

## (undated) DEVICE — SET TUBING PNEUMOCLEAR HIGH FLOW SMOKE EVACUATION (10EA/BX)

## (undated) DEVICE — GOWN WARMING STANDARD FLEX - (30/CA)

## (undated) DEVICE — SET LEADWIRE 5 LEAD BEDSIDE DISPOSABLE ECG (1SET OF 5/EA)

## (undated) DEVICE — MASK WITH FACE SHIELD (25/BX 4BX/CA)

## (undated) DEVICE — TUBE E-T HI-LO CUFF 7.5MM (10EA/PK)

## (undated) DEVICE — PACK MINOR BASIN - (2EA/CA)

## (undated) DEVICE — TOWEL STOP TIMEOUT SAFETY FLAG (40EA/CA)

## (undated) DEVICE — GLOVE BIOGEL PI ORTHO SZ 6 1/2 SURGICAL PF LF (40PR/BX)

## (undated) DEVICE — DRAPE COLUMN  BOX OF 20

## (undated) DEVICE — ROBOTIC SURGERY SERVICES

## (undated) DEVICE — TUBE CONNECTING SUCTION - CLEAR PLASTIC STERILE 72 IN (50EA/CA)

## (undated) DEVICE — SLEEVE, VASO, THIGH, MED

## (undated) DEVICE — KIT SIGMOIDOSCOPE W/BULB AND - SUCTION (1/PK 10PK/CA)

## (undated) DEVICE — BRIEF STRETCH MATERNITY M/L - FITS 20-60IN (5EA/BG 20BG/CA)

## (undated) DEVICE — DRAPE ARM  BOX OF 20

## (undated) DEVICE — CONTAINER, SPECIMEN, STERILE

## (undated) DEVICE — BAG SPONGE COUNT 10.25 X 32 - BLUE (250/CA)

## (undated) DEVICE — NEEDLE INSFL 120MM 14GA VRRS - (20/BX)

## (undated) DEVICE — TUBING CLEARLINK DUO-VENT - C-FLO (48EA/CA)

## (undated) DEVICE — SODIUM CHL IRRIGATION 0.9% 1000ML (12EA/CA)

## (undated) DEVICE — GAUZE FLUFF STERILE 2-PLY 36 X 36 (100EA/CA)

## (undated) DEVICE — GLOVE BIOGEL INDICATOR SZ 7.5 SURGICAL PF LTX - (50PR/BX 4BX/CA)

## (undated) DEVICE — SPONGE GAUZESTER. 2X2 4-PL - (2/PK 50PK/BX 30BX/CS)

## (undated) DEVICE — CANISTER SUCTION 3000ML MECHANICAL FILTER AUTO SHUTOFF MEDI-VAC NONSTERILE LF DISP  (40EA/CA)

## (undated) DEVICE — COVER TIP ENDOWRIST HOT SHEAR - (10EA/BX) DA VINCI

## (undated) DEVICE — COVER LIGHT HANDLE ALC PLUS DISP (18EA/BX)

## (undated) DEVICE — MASK ANESTHESIA ADULT  - (100/CA)

## (undated) DEVICE — GLOVE BIOGEL PI INDICATOR SZ 6.0 SURGICAL PF LF -(200PR/CA)

## (undated) DEVICE — SET EXTENSION WITH 2 PORTS (48EA/CA) ***PART #2C8610 IS A SUBSTITUTE*****

## (undated) DEVICE — GLOVE BIOGEL INDICATOR SZ 7SURGICAL PF LTX - (50/BX 4BX/CA)

## (undated) DEVICE — PROTECTOR ULNA NERVE - (36PR/CA)

## (undated) DEVICE — SENSOR SPO2 NEO LNCS ADHESIVE (20/BX) SEE USER NOTES

## (undated) DEVICE — GLOVE BIOGEL INDICATOR SZ 8.5 SURGICAL PF LTX - (50/BX 4BX/CA)

## (undated) DEVICE — NEEDLE DRIVER MEGA SUTURECUT DA VINCI 15X'S REUSABLE

## (undated) DEVICE — ELECTRODE DUAL RETURN W/ CORD - (50/PK)

## (undated) DEVICE — SHEARS MONOPOLAR CURVED  DA VINCI 10X'S REUSABLE

## (undated) DEVICE — CANNULA O2 COMFORT SOFT EAR ADULT 7 FT TUBING (50/CA)

## (undated) DEVICE — CANISTER SUCTION RIGID RED 1500CC (40EA/CA)

## (undated) DEVICE — LEGGING LITHOTOMY 31 X 48 IN - (2EA/PK 20PK/CA)

## (undated) DEVICE — GLOVE SZ 7 BIOGEL PI MICRO - PF LF (50PR/BX 4BX/CA)

## (undated) DEVICE — DRAPE UNDER BUTTOCK LRG (40/CA)

## (undated) DEVICE — BIPOLAR FORCE DA VINCI 12X'S REISABLE

## (undated) DEVICE — KIT CUSTOM PROCEDURE SINGLE FOR ENDO  (15/CA)

## (undated) DEVICE — SENSOR OXIMETER ADULT SPO2 RD SET (20EA/BX)

## (undated) DEVICE — AGENT HEMOSTATIC BELLOW HEMOBLAST (12EA/CA)

## (undated) DEVICE — GLOVE BIOGEL PI INDICATOR SZ 7.5 SURGICAL PF LF -(50/BX 4BX/CA)

## (undated) DEVICE — DRAPE LARGE 3 QUARTER - (20/CA)

## (undated) DEVICE — GLOVE BIOGEL SZ 7.5 SURGICAL PF LTX - (50PR/BX 4BX/CA)

## (undated) DEVICE — GLOVE BIOGEL PI ORTHO SZ 8.5 PF LF (40/BX)

## (undated) DEVICE — JELLY SURGILUBE STERILE TUBE 4.25 OZ (1/EA)

## (undated) DEVICE — ELECTRODE 850 FOAM ADHESIVE - HYDROGEL RADIOTRNSPRNT (50/PK)

## (undated) DEVICE — HEAD HOLDER JUNIOR/ADULT

## (undated) DEVICE — OBTURATOR BLADELESS STANDARD 8MM (6EA/BX)

## (undated) DEVICE — SUTURE 2-0 20CM STRATAFIX SPIRAL SH NEEDLE (12/BX)

## (undated) DEVICE — Device

## (undated) DEVICE — TRAY SRGPRP PVP IOD WT PRP - (20/CA)

## (undated) DEVICE — GLOVE BIOGEL ECLIPSE PF LATEX SIZE 6.0 (50PR/BX)

## (undated) DEVICE — TUBE CONNECT SUCTION CLEAR 120 X 1/4" (50EA/CA)"

## (undated) DEVICE — SUTURE 4-0 VICRYL PLUS FS-2 - 27 INCH (36/BX)

## (undated) DEVICE — SYSTEM CLEARIFY VISUALIZATION (10EA/PK)